# Patient Record
Sex: FEMALE | Race: WHITE | NOT HISPANIC OR LATINO
[De-identification: names, ages, dates, MRNs, and addresses within clinical notes are randomized per-mention and may not be internally consistent; named-entity substitution may affect disease eponyms.]

---
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
## 2021-09-20 ENCOUNTER — FORM ENCOUNTER (OUTPATIENT)
Age: 21
End: 2021-09-20

## 2021-09-21 ENCOUNTER — FORM ENCOUNTER (OUTPATIENT)
Age: 21
End: 2021-09-21

## 2021-09-21 ENCOUNTER — APPOINTMENT (OUTPATIENT)
Dept: OBGYN | Facility: CLINIC | Age: 21
End: 2021-09-21
Payer: COMMERCIAL

## 2021-09-21 PROCEDURE — 99395 PREV VISIT EST AGE 18-39: CPT

## 2021-09-21 PROCEDURE — 36415 COLL VENOUS BLD VENIPUNCTURE: CPT

## 2021-09-22 ENCOUNTER — FORM ENCOUNTER (OUTPATIENT)
Age: 21
End: 2021-09-22

## 2022-03-07 DIAGNOSIS — Z30.9 ENCOUNTER FOR CONTRACEPTIVE MANAGEMENT, UNSPECIFIED: ICD-10-CM

## 2022-06-06 ENCOUNTER — RX RENEWAL (OUTPATIENT)
Age: 22
End: 2022-06-06

## 2022-09-30 ENCOUNTER — APPOINTMENT (OUTPATIENT)
Dept: OBGYN | Facility: CLINIC | Age: 22
End: 2022-09-30

## 2022-09-30 VITALS
HEIGHT: 63 IN | DIASTOLIC BLOOD PRESSURE: 72 MMHG | WEIGHT: 145 LBS | BODY MASS INDEX: 25.69 KG/M2 | SYSTOLIC BLOOD PRESSURE: 108 MMHG

## 2022-09-30 PROCEDURE — 99395 PREV VISIT EST AGE 18-39: CPT

## 2022-10-03 RX ORDER — ALPRAZOLAM 0.25 MG/1
0.25 TABLET ORAL
Qty: 3 | Refills: 0 | Status: ACTIVE | COMMUNITY
Start: 2022-10-03 | End: 1900-01-01

## 2022-10-03 NOTE — END OF VISIT
[FreeTextEntry3] : I, Adriana Booker, acted solely as a scribe for Dr. Sudha Ocasio, on 09/30/2022. \par \par All medical record entries made by the scribe were at my, Dr. Sudha Ocasio., direction and personally dictated by me on 09/30/2022. I have personally reviewed the chart and agree that the record accurately reflects my personal performance of the history, physical exam, assessment and plan.\par

## 2022-10-03 NOTE — PLAN
[FreeTextEntry1] : PIYUSH WEI is a 22 year old presenting for annual GYN exam\par -exam deferred due to menses \par -right breast ultrasound \par -BSE \par -to schedule pap and Mirena IUD insertion; considering kyleena,  take ibuprofen 600mg and alprazolam 0.25mg 30 minutes prior \par -RTO for IUD insertion
No
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
## 2022-10-03 NOTE — PHYSICAL EXAM
[Appropriately responsive] : appropriately responsive [Alert] : alert [No Acute Distress] : no acute distress [No Lymphadenopathy] : no lymphadenopathy [Soft] : soft [Non-tender] : non-tender [Non-distended] : non-distended [No HSM] : No HSM [No Lesions] : no lesions [No Mass] : no mass [Oriented x3] : oriented x3 [Examination Of The Breasts] : a normal appearance [Normal] : normal [___cm] : a ~M [unfilled] ~Ucm superior lateral quadrant mass was palpated

## 2022-11-07 ENCOUNTER — APPOINTMENT (OUTPATIENT)
Dept: OBGYN | Facility: CLINIC | Age: 22
End: 2022-11-07

## 2022-11-07 VITALS — DIASTOLIC BLOOD PRESSURE: 73 MMHG | WEIGHT: 132 LBS | SYSTOLIC BLOOD PRESSURE: 112 MMHG

## 2022-11-07 DIAGNOSIS — Z30.430 ENCOUNTER FOR INSERTION OF INTRAUTERINE CONTRACEPTIVE DEVICE: ICD-10-CM

## 2022-11-07 PROCEDURE — 58300 INSERT INTRAUTERINE DEVICE: CPT

## 2022-11-07 PROCEDURE — 81025 URINE PREGNANCY TEST: CPT

## 2022-11-07 NOTE — PROCEDURE
[LMP ___] : LMP was [unfilled] [Neg Pregnancy Test] : a pregnancy test was negative [Lot Number: ___] : IUD lot number: [unfilled] [IUD Placement] : intrauterine device (IUD) placement [Prevention of Pregnancy] : prevention of pregnancy [Risks] : risks [Benefits] : benefits [Alternatives] : alternatives [Patient] : patient [Infection] : infection [Bleeding] : bleeding [Pain] : pain [Expulsion] : expulsion [Failure] : failure [Uterine Perforation] : uterine perforation [None] : none [Easy Passage] : allowed easy passage of a uterine sound without dilation [Tolerated Well] : the patient tolerated the procedure well [No Complications] : there were no complications [Uterus Sounded to ___cm] : sounded to [unfilled]Ucm [US Guidance] : Ultrasound guidance was used to place the IUD [Motrin/Ibuprofen] : Motrin/Ibuprofen [Post Placement Transvag. US] : post placement transvaginal ultrasound completed [Pelvic Pain] : the patient complained of pelvic pain [de-identified] : urine pregnancy test negative at time of visit today [de-identified] : alprazolam 0.25mg  [de-identified] : Sonja clamp [de-identified] : giselle EXP 11/2023

## 2022-11-07 NOTE — END OF VISIT
[FreeTextEntry3] : I, Gigi Junior, acted as a scribe on behalf of Dr. Sudha Ocasio on 11/07/2022 .\par \par All medical entries made by the scribe were at my, Dr. Sudha Ocasio's, direction and personally dictated by me on 11/07/2022. I have reviewed the chart and agree that the record accurately reflects my personal performance of the history, physical exam, assessment and plan. I have also personally directed, reviewed, and agreed with the chart.

## 2022-11-08 LAB
C TRACH RRNA SPEC QL NAA+PROBE: NOT DETECTED
N GONORRHOEA RRNA SPEC QL NAA+PROBE: NOT DETECTED
SOURCE TP AMPLIFICATION: NORMAL

## 2022-11-13 LAB — CYTOLOGY CVX/VAG DOC THIN PREP: NORMAL

## 2022-12-01 ENCOUNTER — APPOINTMENT (OUTPATIENT)
Dept: OBGYN | Facility: CLINIC | Age: 22
End: 2022-12-01

## 2022-12-20 ENCOUNTER — APPOINTMENT (OUTPATIENT)
Dept: OBGYN | Facility: CLINIC | Age: 22
End: 2022-12-20

## 2022-12-20 VITALS
BODY MASS INDEX: 23.57 KG/M2 | HEIGHT: 63 IN | HEART RATE: 65 BPM | OXYGEN SATURATION: 99 % | WEIGHT: 133 LBS | DIASTOLIC BLOOD PRESSURE: 72 MMHG | SYSTOLIC BLOOD PRESSURE: 112 MMHG

## 2022-12-20 DIAGNOSIS — Z30.431 ENCOUNTER FOR ROUTINE CHECKING OF INTRAUTERINE CONTRACEPTIVE DEVICE: ICD-10-CM

## 2022-12-20 PROCEDURE — 99213 OFFICE O/P EST LOW 20 MIN: CPT

## 2022-12-20 NOTE — HISTORY OF PRESENT ILLNESS
[FreeTextEntry1] : PIYUSH WEI is a 22 year old presenting for IUD check. Pt had Kyleena IUD placed 11/7/22. Pt reports still bleeding, varies in heaviness. \par Neg pap 9/30/22.  [TextBox_4] : pt here today for IUD check.

## 2022-12-20 NOTE — END OF VISIT
[FreeTextEntry3] : I, Adriana Booker, acted solely as a scribe for Dr. Sudha Ocasio, on 12/20/2022. \par \par All medical record entries made by the scribe were at my, Dr. Sudha Ocasio., direction and personally dictated by me on 12/20/2022. I have personally reviewed the chart and agree that the record accurately reflects my personal performance of the history, physical exam, assessment and plan.\par

## 2022-12-20 NOTE — PLAN
[FreeTextEntry1] : PIYUSH WEI is a 22 year old presenting for IUD check\par -TVUS showed Kyleena IUD in appropriate position\par -advised starting OCP for a month to stabilize bleeding\par \par RTO for annual 11/2023

## 2023-03-28 ENCOUNTER — APPOINTMENT (OUTPATIENT)
Dept: OBGYN | Facility: CLINIC | Age: 23
End: 2023-03-28
Payer: COMMERCIAL

## 2023-03-28 VITALS
SYSTOLIC BLOOD PRESSURE: 110 MMHG | HEIGHT: 63 IN | DIASTOLIC BLOOD PRESSURE: 69 MMHG | BODY MASS INDEX: 25.34 KG/M2 | WEIGHT: 143 LBS | OXYGEN SATURATION: 97 % | HEART RATE: 86 BPM

## 2023-03-28 DIAGNOSIS — N76.0 ACUTE VAGINITIS: ICD-10-CM

## 2023-03-28 DIAGNOSIS — Z11.3 ENCOUNTER FOR SCREENING FOR INFECTIONS WITH A PREDOMINANTLY SEXUAL MODE OF TRANSMISSION: ICD-10-CM

## 2023-03-28 DIAGNOSIS — B96.89 ACUTE VAGINITIS: ICD-10-CM

## 2023-03-28 DIAGNOSIS — R10.2 PELVIC AND PERINEAL PAIN: ICD-10-CM

## 2023-03-28 DIAGNOSIS — Z11.8 ENCOUNTER FOR SCREENING FOR OTHER INFECTIOUS AND PARASITIC DISEASES: ICD-10-CM

## 2023-03-28 DIAGNOSIS — N63.10 UNSPECIFIED LUMP IN THE RIGHT BREAST, UNSPECIFIED QUADRANT: ICD-10-CM

## 2023-03-28 DIAGNOSIS — Z11.59 ENCOUNTER FOR SCREENING FOR OTHER VIRAL DISEASES: ICD-10-CM

## 2023-03-28 PROCEDURE — 99213 OFFICE O/P EST LOW 20 MIN: CPT

## 2023-03-28 NOTE — PLAN
[FreeTextEntry1] : 21 yo female pt complaining of pelvic pain\par \par -BD affirm, GC/CT\par -sono showed IUD in place, normal ovaries, small free fluid in cul-de-sac, possible ruptured ovarian cyst\par \par \par -rx for right breast ultrasound\par -rto September for annual exam \par

## 2023-03-28 NOTE — HISTORY OF PRESENT ILLNESS
[Patient reported PAP Smear was normal] : Patient reported PAP Smear was normal [FreeTextEntry1] : 23 yo female pt presents with pelvic pain. The pt had a Kyleena IUD inserted Nov. 2022. Had IUD check in December 2022. The pt currently c/o pelvic discomfort onset Saturday and worsened Sunday night, now improved. Patient denies vaginal discharge, odor or urinary symptoms. The pt also reports having a new right breast mass, was given an US in the past but did not go. \par \par The pt is with the same partner. Denies new sexual partner. [PapSmeardate] : 11/22

## 2023-03-28 NOTE — PROCEDURE
[Locate IUD] : locate IUD [FreeTextEntry3] : IUD was in place, ovaries were normal, free fluid in the cul de sac

## 2023-03-29 LAB
C TRACH RRNA SPEC QL NAA+PROBE: NOT DETECTED
N GONORRHOEA RRNA SPEC QL NAA+PROBE: NOT DETECTED
SOURCE AMPLIFICATION: NORMAL

## 2023-03-30 LAB
CANDIDA VAG CYTO: NOT DETECTED
G VAGINALIS+PREV SP MTYP VAG QL MICRO: DETECTED
T VAGINALIS VAG QL WET PREP: NOT DETECTED

## 2023-03-31 RX ORDER — METRONIDAZOLE 7.5 MG/G
0.75 GEL VAGINAL
Qty: 1 | Refills: 0 | Status: ACTIVE | COMMUNITY
Start: 2023-03-31 | End: 1900-01-01

## 2023-04-01 ENCOUNTER — OUTPATIENT (OUTPATIENT)
Dept: OUTPATIENT SERVICES | Facility: HOSPITAL | Age: 23
LOS: 1 days | End: 2023-04-01

## 2023-04-01 ENCOUNTER — RESULT REVIEW (OUTPATIENT)
Age: 23
End: 2023-04-01

## 2023-04-01 ENCOUNTER — APPOINTMENT (OUTPATIENT)
Dept: ULTRASOUND IMAGING | Facility: CLINIC | Age: 23
End: 2023-04-01
Payer: COMMERCIAL

## 2023-04-01 ENCOUNTER — TRANSCRIPTION ENCOUNTER (OUTPATIENT)
Age: 23
End: 2023-04-01

## 2023-04-01 PROCEDURE — 76641 ULTRASOUND BREAST COMPLETE: CPT | Mod: 26,RT

## 2023-04-05 ENCOUNTER — APPOINTMENT (OUTPATIENT)
Dept: ULTRASOUND IMAGING | Facility: IMAGING CENTER | Age: 23
End: 2023-04-05
Payer: COMMERCIAL

## 2023-04-05 ENCOUNTER — OUTPATIENT (OUTPATIENT)
Dept: OUTPATIENT SERVICES | Facility: HOSPITAL | Age: 23
LOS: 1 days | End: 2023-04-05
Payer: COMMERCIAL

## 2023-04-05 DIAGNOSIS — N63.10 UNSPECIFIED LUMP IN THE RIGHT BREAST, UNSPECIFIED QUADRANT: ICD-10-CM

## 2023-04-05 PROCEDURE — 88305 TISSUE EXAM BY PATHOLOGIST: CPT | Mod: 26

## 2023-04-05 PROCEDURE — A4648: CPT

## 2023-04-05 PROCEDURE — 88305 TISSUE EXAM BY PATHOLOGIST: CPT

## 2023-04-05 PROCEDURE — 19083 BX BREAST 1ST LESION US IMAG: CPT

## 2023-04-05 PROCEDURE — 19083 BX BREAST 1ST LESION US IMAG: CPT | Mod: RT

## 2023-04-07 LAB — SURGICAL PATHOLOGY STUDY: SIGNIFICANT CHANGE UP

## 2023-09-29 ENCOUNTER — APPOINTMENT (OUTPATIENT)
Dept: OBGYN | Facility: CLINIC | Age: 23
End: 2023-09-29
Payer: COMMERCIAL

## 2023-09-29 VITALS
DIASTOLIC BLOOD PRESSURE: 71 MMHG | HEIGHT: 63 IN | WEIGHT: 140 LBS | SYSTOLIC BLOOD PRESSURE: 108 MMHG | BODY MASS INDEX: 24.8 KG/M2

## 2023-09-29 PROCEDURE — 58301 REMOVE INTRAUTERINE DEVICE: CPT

## 2023-10-12 ENCOUNTER — APPOINTMENT (OUTPATIENT)
Dept: OBGYN | Facility: CLINIC | Age: 23
End: 2023-10-12
Payer: COMMERCIAL

## 2023-10-12 VITALS
HEIGHT: 63 IN | WEIGHT: 140 LBS | SYSTOLIC BLOOD PRESSURE: 119 MMHG | DIASTOLIC BLOOD PRESSURE: 85 MMHG | BODY MASS INDEX: 24.8 KG/M2

## 2023-10-12 DIAGNOSIS — Z01.419 ENCOUNTER FOR GYNECOLOGICAL EXAMINATION (GENERAL) (ROUTINE) W/OUT ABNORMAL FINDINGS: ICD-10-CM

## 2023-10-12 PROCEDURE — 99395 PREV VISIT EST AGE 18-39: CPT

## 2023-10-14 PROBLEM — Z01.419 WOMEN'S ANNUAL ROUTINE GYNECOLOGICAL EXAMINATION: Status: ACTIVE | Noted: 2022-10-03

## 2023-11-24 ENCOUNTER — APPOINTMENT (OUTPATIENT)
Dept: OBGYN | Facility: CLINIC | Age: 23
End: 2023-11-24
Payer: COMMERCIAL

## 2023-11-24 VITALS
DIASTOLIC BLOOD PRESSURE: 72 MMHG | WEIGHT: 138 LBS | BODY MASS INDEX: 24.45 KG/M2 | SYSTOLIC BLOOD PRESSURE: 113 MMHG | HEIGHT: 63 IN

## 2023-11-24 DIAGNOSIS — O03.4 INCOMPLETE SPONTANEOUS ABORTION W/OUT COMPLICATION: ICD-10-CM

## 2023-11-24 PROCEDURE — 76817 TRANSVAGINAL US OBSTETRIC: CPT

## 2023-11-24 PROCEDURE — 36415 COLL VENOUS BLD VENIPUNCTURE: CPT

## 2023-11-24 PROCEDURE — 99214 OFFICE O/P EST MOD 30 MIN: CPT | Mod: 25

## 2023-11-27 ENCOUNTER — TRANSCRIPTION ENCOUNTER (OUTPATIENT)
Age: 23
End: 2023-11-27

## 2023-11-27 LAB
ABO + RH PNL BLD: NORMAL
C TRACH RRNA SPEC QL NAA+PROBE: NOT DETECTED
HCG SERPL-MCNC: ABNORMAL MIU/ML
N GONORRHOEA RRNA SPEC QL NAA+PROBE: NOT DETECTED
SOURCE AMPLIFICATION: NORMAL

## 2023-11-30 ENCOUNTER — APPOINTMENT (OUTPATIENT)
Dept: OBGYN | Facility: CLINIC | Age: 23
End: 2023-11-30
Payer: COMMERCIAL

## 2023-11-30 VITALS
WEIGHT: 142 LBS | HEIGHT: 63 IN | BODY MASS INDEX: 25.16 KG/M2 | SYSTOLIC BLOOD PRESSURE: 115 MMHG | HEART RATE: 85 BPM | DIASTOLIC BLOOD PRESSURE: 75 MMHG

## 2023-11-30 DIAGNOSIS — O02.1 MISSED ABORTION: ICD-10-CM

## 2023-11-30 PROCEDURE — 99213 OFFICE O/P EST LOW 20 MIN: CPT

## 2023-12-04 ENCOUNTER — OUTPATIENT (OUTPATIENT)
Dept: OUTPATIENT SERVICES | Facility: HOSPITAL | Age: 23
LOS: 1 days | End: 2023-12-04
Payer: COMMERCIAL

## 2023-12-04 VITALS
WEIGHT: 138.89 LBS | SYSTOLIC BLOOD PRESSURE: 107 MMHG | OXYGEN SATURATION: 98 % | HEART RATE: 98 BPM | TEMPERATURE: 98 F | RESPIRATION RATE: 20 BRPM | DIASTOLIC BLOOD PRESSURE: 70 MMHG | HEIGHT: 63 IN

## 2023-12-04 DIAGNOSIS — O02.1 MISSED ABORTION: ICD-10-CM

## 2023-12-04 DIAGNOSIS — Z01.818 ENCOUNTER FOR OTHER PREPROCEDURAL EXAMINATION: ICD-10-CM

## 2023-12-04 LAB
BLD GP AB SCN SERPL QL: NEGATIVE — SIGNIFICANT CHANGE UP
BLD GP AB SCN SERPL QL: NEGATIVE — SIGNIFICANT CHANGE UP
HCT VFR BLD CALC: 34.1 % — LOW (ref 34.5–45)
HCT VFR BLD CALC: 34.1 % — LOW (ref 34.5–45)
HGB BLD-MCNC: 12.3 G/DL — SIGNIFICANT CHANGE UP (ref 11.5–15.5)
HGB BLD-MCNC: 12.3 G/DL — SIGNIFICANT CHANGE UP (ref 11.5–15.5)
MCHC RBC-ENTMCNC: 31.4 PG — SIGNIFICANT CHANGE UP (ref 27–34)
MCHC RBC-ENTMCNC: 31.4 PG — SIGNIFICANT CHANGE UP (ref 27–34)
MCHC RBC-ENTMCNC: 36.1 GM/DL — HIGH (ref 32–36)
MCHC RBC-ENTMCNC: 36.1 GM/DL — HIGH (ref 32–36)
MCV RBC AUTO: 87 FL — SIGNIFICANT CHANGE UP (ref 80–100)
MCV RBC AUTO: 87 FL — SIGNIFICANT CHANGE UP (ref 80–100)
NRBC # BLD: 0 /100 WBCS — SIGNIFICANT CHANGE UP (ref 0–0)
NRBC # BLD: 0 /100 WBCS — SIGNIFICANT CHANGE UP (ref 0–0)
PLATELET # BLD AUTO: 227 K/UL — SIGNIFICANT CHANGE UP (ref 150–400)
PLATELET # BLD AUTO: 227 K/UL — SIGNIFICANT CHANGE UP (ref 150–400)
RBC # BLD: 3.92 M/UL — SIGNIFICANT CHANGE UP (ref 3.8–5.2)
RBC # BLD: 3.92 M/UL — SIGNIFICANT CHANGE UP (ref 3.8–5.2)
RBC # FLD: 11.6 % — SIGNIFICANT CHANGE UP (ref 10.3–14.5)
RBC # FLD: 11.6 % — SIGNIFICANT CHANGE UP (ref 10.3–14.5)
RH IG SCN BLD-IMP: POSITIVE — SIGNIFICANT CHANGE UP
RH IG SCN BLD-IMP: POSITIVE — SIGNIFICANT CHANGE UP
WBC # BLD: 12.43 K/UL — HIGH (ref 3.8–10.5)
WBC # BLD: 12.43 K/UL — HIGH (ref 3.8–10.5)
WBC # FLD AUTO: 12.43 K/UL — HIGH (ref 3.8–10.5)
WBC # FLD AUTO: 12.43 K/UL — HIGH (ref 3.8–10.5)

## 2023-12-04 PROCEDURE — 86901 BLOOD TYPING SEROLOGIC RH(D): CPT

## 2023-12-04 PROCEDURE — 36415 COLL VENOUS BLD VENIPUNCTURE: CPT

## 2023-12-04 PROCEDURE — G0463: CPT

## 2023-12-04 PROCEDURE — 86900 BLOOD TYPING SEROLOGIC ABO: CPT

## 2023-12-04 PROCEDURE — 86850 RBC ANTIBODY SCREEN: CPT

## 2023-12-04 PROCEDURE — 85027 COMPLETE CBC AUTOMATED: CPT

## 2023-12-04 RX ORDER — LIDOCAINE HCL 20 MG/ML
0.2 VIAL (ML) INJECTION ONCE
Refills: 0 | Status: DISCONTINUED | OUTPATIENT
Start: 2023-12-08 | End: 2023-12-22

## 2023-12-04 RX ORDER — SODIUM CHLORIDE 9 MG/ML
1000 INJECTION, SOLUTION INTRAVENOUS
Refills: 0 | Status: DISCONTINUED | OUTPATIENT
Start: 2023-12-08 | End: 2023-12-22

## 2023-12-04 NOTE — H&P PST ADULT - ASSESSMENT
Airway:  normal    Mallampati-       Dental: Patient denies loose teeth    Activity :  dasi mets :     Airway:  normal    Mallampati-       Dental: Patient denies loose teeth    Activity : ACTIVE ALL DAY   dasi mets : 6 Dasi mets

## 2023-12-04 NOTE — H&P PST ADULT - HISTORY OF PRESENT ILLNESS
23 yr old female , G0 , with amenorrhea & positive pregnancy test - Had a sonogram that revealed 9 weeks with no FHR. Now coming in for  D & C for missed  on 2023.     Denies Recent travel, Exposure or Covid symptoms

## 2023-12-04 NOTE — H&P PST ADULT - ATTENDING COMMENTS
Attending Note  Pt with MAB 9 weeks here for d&c. discussed risks of procedure including but not limited to VTE, SSI, uterine perforation, need for transfusion. Reviewed risks of surgery all questions answered. Pt aware that learners are part of her case and performing EUA.  RH+  Anabell Solano MD

## 2023-12-04 NOTE — H&P PST ADULT - BP NONINVASIVE MEAN (MM HG)
3/13/2017       RE: Jannie Dexter  [address] [place] [place] [address]     Dear Colleague,    Thank you for referring your patient, Jannie Dexter, to the [facility] at [facility]. Please see a copy of my visit note below.    [facility] Dermatology Note      Dermatology Problem List:  1. NUB on the chest: SCC, BCC, BLK, AK, irritated cherry angioma.   -s/p shave biopsy 3/13/2017  2. Rosacea w/ Telangiectasia of face.  -s/p PDL 2/10/2017 (full face)  -Anticipated Next PDL settings --> decreasing purpura  Energy: 8 J/cm2 (Inc from 7)  Spot size:10mm  Pulse width: 10 mS (Inc from 6)  3. Frequent Tanning victoria use: will continue talking her out of it    Encounter Date: Mar 13, 2017    CC:  Chief Complaint   Patient presents with     RECHECK     post pdl 4 weeks redness is better but not where she wants it yet          History of Present Illness:  Ms. Jannie Kingsley is a 35 year old female who presents as a follow up post PDL treatment for rosacea and NUB on chest. She was last seen 2/10/2017 when pt underwent PDL for rosacea. Today, the pt reports the PDL improved the outside of her cheeks and parts of her nose but pt states the bruising was really intense. She is otherwise pleased with her results and is ready for another round of treatment. Her rosacea worsens right after a hot shower. She is interested in another session but will do it after her wedding and honeymoon in [place].  In addition, pt states she is ready to biopsy the spot on her chest. It has been growing and shrinking in size, as well as, spontaneously bleeding. No other skin concerns at this time.     Past Medical History:   Patient Active Problem List   Diagnosis     Nexplanon in place     CARDIOVASCULAR SCREENING; LDL GOAL LESS THAN 160     Exercise-induced asthma     Bunion     Intermittent asthma     Past Medical History   Diagnosis Date     Asthma      Exercised  induced      Nexplanon in place      placed 1/3/13     Seasonal allergies      Past Surgical History   Procedure Laterality Date     Breast surgery  11/2006     Implants, saline     Bunionectomy  11/1/13     left foot       Social History:  Wedding and [employer] in [place] in May 2017  The patient works as a . The patient admits to frequent use of tanning beds. She admits to consumption of 7-14 alcoholic beverages a week and is a nonsmoker.     Family History:  Reviewed and unchanged but kept in chart for clinician convenience  There is no family history of skin cancer or melanoma.    Medications:  Current Outpatient Prescriptions   Medication Sig Dispense Refill     spironolactone (ALDACTONE) 100 MG tablet TAKE ONE TABLET BY MOUTH ONE TIME DAILY 30 tablet 6     glycopyrrolate (ROBINUL) 1 MG tablet Take 1 tablet (1 mg) by mouth 3 times daily 90 tablet 6     triamcinolone (KENALOG) 0.1 % cream Apply to affected area in axilla bid for 7-10 consecutive days, not to be used on face or groin 15 g 1     cetirizine (ZYRTEC) 10 MG tablet Take 1 tablet (10 mg) by mouth every evening 60 tablet 11     albuterol (PROAIR HFA, PROVENTIL HFA, VENTOLIN HFA) 108 (90 BASE) MCG/ACT inhaler Inhale 2 puffs into the lungs every 6 hours as needed for shortness of breath / dyspnea 1 Inhaler 0       Allergies   Allergen Reactions     Penicillins Rash     Fever       Review of Systems:  -Constitutional: The patient is otherwise feeling well.       Physical exam:  Vitals: There were no vitals taken for this visit.  GEN: This is a well-nourished, well developed female in no acute distress  NEURO: Alert and oriented  PSYCH: in a pleasant mood, appropriate affect  SKIN: Focused examination of the Head, face, neck, upper chest was performed.  -There is mild erythema on the forehead and lateral face, improved since prior to PDL  -resolution of telangiectasias on the cheeks.  -There are minimal lacy erythema on the forehead and  nose.   -On the right medial chest there is a 8 mm sized pink papule with excoriation.  -No other lesions of concern on areas examined.       Impression/Plan:  1. Neoplasm of uncertain behavior on the right chest. DDX: BCC, SCC, BLK, AK, irritated cherry angioma . Given history of tanning victoria use.    Shave biopsy:  After discussion of benefits and risks including but not limited to bleeding/bruising, pain/swelling, infection, scar, incomplete removal, nerve damage/numbness, recurrence, and non-diagnostic biopsy, written consent, verbal consent and photographs were obtained. Time-out was performed. The area was cleaned with isopropyl alcohol. 0.5mL of 1% lidocaine with epinephrine was injected to obtain adequate anesthesia of the lesion on the right chest. A shave biopsy was performed. Hemostasis was achieved with aluminium chloride. Vaseline and a sterile dressing were applied. The patient tolerated the procedure and no complications were noted. The patient was provided with verbal and written post care instructions.   2. Rosacea w/ Telangiectasia: improved with 1 PDL. New photos taken. Pt will need another PDL but focused on the nose and cheek but better AFTER wedding in case things other.  Will only treat nose and cheeks most likely.     Pt is interested in another round of PDL treatment.  method of treatment and scheduled for cosmetic treatment. WArned that it may take 3 treatments to achieve adequate results. Side effect eye damage, bruising, redness, swelling, scarring among others.      Follow-up in prn pending biopsy results. Can schedule at her leisure for another PDL for rosacea/redness      Staff Involved:  Scribe/Staff      Scribe Disclosure:   I, Guerrero Walters, am serving as a scribe to document services personally performed by Dr. Pawel Obrien, based on data collection and the provider's statements to me.     Provider Disclosure:   I have reviewed the documentation recorded by the scribe and have edited it  as needed. I have personally performed the services documented here and the documentation accurately represents those services and the decisions made by me.     Pawel Obrien MD, MS    Department of Dermatology  [facility]: Phone: [phone], Fax:[phone]  [facility]: Phone: [phone], Fax: [phone]             1/05/2017                                                                                                   3/13/2017                                                Again, thank you for allowing me to participate in the care of your patient.      Sincerely,    Pawel Obrien MD       82

## 2023-12-05 ENCOUNTER — TRANSCRIPTION ENCOUNTER (OUTPATIENT)
Age: 23
End: 2023-12-05

## 2023-12-07 ENCOUNTER — TRANSCRIPTION ENCOUNTER (OUTPATIENT)
Age: 23
End: 2023-12-07

## 2023-12-07 RX ORDER — FENTANYL CITRATE 50 UG/ML
25 INJECTION INTRAVENOUS
Refills: 0 | Status: DISCONTINUED | OUTPATIENT
Start: 2023-12-08 | End: 2023-12-08

## 2023-12-07 RX ORDER — ONDANSETRON 8 MG/1
4 TABLET, FILM COATED ORAL ONCE
Refills: 0 | Status: DISCONTINUED | OUTPATIENT
Start: 2023-12-08 | End: 2023-12-22

## 2023-12-08 ENCOUNTER — RESULT REVIEW (OUTPATIENT)
Age: 23
End: 2023-12-08

## 2023-12-08 ENCOUNTER — TRANSCRIPTION ENCOUNTER (OUTPATIENT)
Age: 23
End: 2023-12-08

## 2023-12-08 ENCOUNTER — OUTPATIENT (OUTPATIENT)
Dept: OUTPATIENT SERVICES | Facility: HOSPITAL | Age: 23
LOS: 1 days | End: 2023-12-08
Payer: COMMERCIAL

## 2023-12-08 VITALS
RESPIRATION RATE: 16 BRPM | HEIGHT: 63 IN | WEIGHT: 138.89 LBS | HEART RATE: 111 BPM | DIASTOLIC BLOOD PRESSURE: 63 MMHG | OXYGEN SATURATION: 98 % | TEMPERATURE: 98 F | SYSTOLIC BLOOD PRESSURE: 114 MMHG

## 2023-12-08 VITALS
HEART RATE: 76 BPM | SYSTOLIC BLOOD PRESSURE: 108 MMHG | DIASTOLIC BLOOD PRESSURE: 56 MMHG | OXYGEN SATURATION: 100 % | RESPIRATION RATE: 16 BRPM

## 2023-12-08 DIAGNOSIS — O02.1 MISSED ABORTION: ICD-10-CM

## 2023-12-08 LAB
BLD GP AB SCN SERPL QL: NEGATIVE — SIGNIFICANT CHANGE UP
BLD GP AB SCN SERPL QL: NEGATIVE — SIGNIFICANT CHANGE UP
RH IG SCN BLD-IMP: POSITIVE — SIGNIFICANT CHANGE UP
RH IG SCN BLD-IMP: POSITIVE — SIGNIFICANT CHANGE UP

## 2023-12-08 PROCEDURE — 59820 CARE OF MISCARRIAGE: CPT

## 2023-12-08 PROCEDURE — 88305 TISSUE EXAM BY PATHOLOGIST: CPT | Mod: 26

## 2023-12-08 PROCEDURE — 76998 US GUIDE INTRAOP: CPT | Mod: 26

## 2023-12-08 PROCEDURE — 88291 CYTO/MOLECULAR REPORT: CPT

## 2023-12-08 PROCEDURE — 59840 INDUCED ABORTION D&C: CPT

## 2023-12-08 PROCEDURE — 86900 BLOOD TYPING SEROLOGIC ABO: CPT

## 2023-12-08 PROCEDURE — 88264 CHROMOSOME ANALYSIS 20-25: CPT

## 2023-12-08 PROCEDURE — 86901 BLOOD TYPING SEROLOGIC RH(D): CPT

## 2023-12-08 PROCEDURE — 88305 TISSUE EXAM BY PATHOLOGIST: CPT

## 2023-12-08 PROCEDURE — 88280 CHROMOSOME KARYOTYPE STUDY: CPT

## 2023-12-08 PROCEDURE — 86850 RBC ANTIBODY SCREEN: CPT

## 2023-12-08 PROCEDURE — 88233 TISSUE CULTURE SKIN/BIOPSY: CPT

## 2023-12-08 RX ORDER — ACETAMINOPHEN 500 MG
3 TABLET ORAL
Qty: 0 | Refills: 0 | DISCHARGE

## 2023-12-08 RX ORDER — IBUPROFEN 200 MG
1 TABLET ORAL
Qty: 0 | Refills: 0 | DISCHARGE

## 2023-12-08 RX ADMIN — SODIUM CHLORIDE 100 MILLILITER(S): 9 INJECTION, SOLUTION INTRAVENOUS at 09:09

## 2023-12-08 NOTE — ASU PATIENT PROFILE, ADULT - FALL HARM RISK - UNIVERSAL INTERVENTIONS
Bed in lowest position, wheels locked, appropriate side rails in place/Call bell, personal items and telephone in reach/Instruct patient to call for assistance before getting out of bed or chair/Non-slip footwear when patient is out of bed/[place] to call system/Physically safe environment - no spills, clutter or unnecessary equipment/Purposeful Proactive Rounding/Room/bathroom lighting operational, light cord in reach Bed in lowest position, wheels locked, appropriate side rails in place/Call bell, personal items and telephone in reach/Instruct patient to call for assistance before getting out of bed or chair/Non-slip footwear when patient is out of bed/[place] to call system/Physically safe environment - no spills, clutter or unnecessary equipment/Purposeful Proactive Rounding/Room/bathroom lighting operational, light cord in reach

## 2023-12-08 NOTE — ASU DISCHARGE PLAN (ADULT/PEDIATRIC) - NURSING INSTRUCTIONS
OK to take Tylenol/Acetaminophen at _/ after 5:55PM_____ for pain and every 6 hours after as needed. OK to take Motrin/Ibuprofen at _? after 6:05PM____ for pain and every 6 hours after as needed. Take pain medicines alternate.

## 2023-12-08 NOTE — ASU DISCHARGE PLAN (ADULT/PEDIATRIC) - ASU DC SPECIAL INSTRUCTIONSFT
Regular diet as tolerated, regular activity as tolerated, no heavy lifting for first two weeks.  Nothing per vagina: no intercourse, tampons or douching.  Call your provider if you experience fevers, chills, worsening abdominal pain, inability to urinate or worsening vaginal bleeding.  Follow up with Dr. Solano in 2 weeks.

## 2023-12-08 NOTE — ASU DISCHARGE PLAN (ADULT/PEDIATRIC) - PROVIDER TOKENS
PROVIDER:[TOKEN:[[id number]],FOLLOWUP:[2 weeks]] PROVIDER:[TOKEN:[[id number]],FOLLOWUP:[2 weeks]]

## 2023-12-08 NOTE — ASU DISCHARGE PLAN (ADULT/PEDIATRIC) - CARE PROVIDER_API CALL
Anabell Solano  Obstetrics and Gynecology  [address] [place] [address], [facility] [address]  [place] [address]  Phone: [phone]  Fax: [phone]  Follow Up Time: 2 weeks   Anabell Solano  Obstetrics and Gynecology  [address] [place] [address], [facility] [address]  [place] [address]  Phone: [phone]  Fax: [phone]  Follow Up Time: 2 weeks

## 2023-12-08 NOTE — ASU DISCHARGE PLAN (ADULT/PEDIATRIC) - NS MD DC FALL RISK RISK
For information on Fall & Injury Prevention, visit: [email or web address] OR  [email or web address] OR  https://www.cdc.gov/steadi/patient.html For information on Fall & Injury Prevention, visit: [email or web address]-[email or web address] OR  [email or web address]/[email or web address]-prevention-tips-to-avoid-injury OR  https://www.cdc.gov/steadi/patient.html

## 2023-12-08 NOTE — BRIEF OPERATIVE NOTE - OPERATION/FINDINGS
INCOMPLETE Ultrasound revealed 9 wk size anteverted uterus with findings consistent with missed . DVC performed under ultrasound guidance. Ultrasound after the procedure showing a thin endometrial stripe. Excellent hemostasis.

## 2023-12-08 NOTE — BRIEF OPERATIVE NOTE - NSICDXBRIEFPROCEDURE_GEN_ALL_CORE_FT
PROCEDURES:  Dilation and curettage, uterus, using suction, with US guidance 08-Dec-2023 12:22:09  Reva Maldonado

## 2023-12-08 NOTE — ASU PATIENT PROFILE, ADULT - FALL HARM RISK - PATIENT NEEDS ASSISTANCE
Date of Service: 11/01/2022    HISTORY OF PRESENT ILLNESS:   Vicente Dickson is a pleasant and conversant 57-year-old gentleman referred by Dr. Luis Enrique Zendejas whom the patient had worked with for a symptomatic right inguinal hernia.    Vicente has had a fairly eventful past year with a diagnosis of significant chronic liver disease with ascites related to previous alcohol use, I believe.  He has worked with Dr. Gaffney and the hepatology team over at [facility] and has had a remarkable turnaround with improvement in his liver function tests as well as his exam with ascites, etc.  He has had a significant amount of weight loss saying that he has lost over 100 pounds \"due to the illness\" since this all started, which I am sure includes significant loss of ascitic fluid weight. During that time, he has had a symptomatic right inguinal hernia, which was quite bulging and uncomfortable, but was told \"in no uncertain terms\" that he could not have surgical intervention during his illness.  It was mentioned that it could be repaired during his liver transplant should that occur.  With his improvement in health,  he recently visited back to the hepatology service on 10/30/2022 and per review of that note from Melida Bruce PA-C., there is mention of \"okay to proceed with hernia repair\" and the note having been reviewed by Dr. Gaffney.      The patient is very open to discussion and wants to know our thoughts regarding what he should do, timing and even the location of doing so.  He states that he had had this appointment after that visit with the transplant service over at [facility] just a couple days ago and wanted to keep it based on what he had heard. He does note he has had a history of an umbilical hernia repair \"a long time ago.\"    He also notes that he was recently traveling with his family and had so much discomfort in his right groin that he was not really able to do things he needed to do and it popped out multiple  times.  He has not had obstructive symptoms.    ALLERGIES:     He has seasonal allergies only.      MEDICATIONS:    1.  Campral EC.    2.  Baclofen.    3.  Prozac.    4.  Folate.    5.  Lasix.    6.  Nicoderm patch.    7.  Lyrica.    8.  Xifaxan.    9.  Thiamine.    10.  Desyrel.     PAST MEDICAL HISTORY:   His medical history was reviewed, includes his end-stage liver disease, history of tobacco use, history of morbid obesity, now with a BMI of 26 kilograms per meter squared.      PAST SURGICAL HISTORY:   Surgically, he has had a previous umbilical hernia repair in 2009  at [facility].  He is not sure if mesh was used.  He has had a previous TURP in 10/2020 by Dr. Gill that went very well with an improvement in his quality of life.    FAMILY HISTORY:   Negative for any definite hernia disease, but does note significant history of alcohol use in the family.      SOCIAL HISTORY:   Tobacco use:  He has \"1-2 per day\" at times, but he knows he needs to abstain before an intervention.  He does not drink or use drugs at this time.    REVIEW OF SYSTEMS:   Negative for any significant exertional chest pain or shortness of breath and not surprisingly,  he feels that his health is generally significantly improved.  He is both appreciative of that and wants to keep it that way.  He denies left groin pain.  Urinary symptoms are remarkably better.  He has not noted a recurrence at his umbilicus.    PHYSICAL EXAMINATION:   GENERAL:  He is a pleasant, talkative gentleman who does not appear to be in distress.  VITAL SIGNS:  Stable.  He is afebrile.  He is 6 feet 3, 95 kg, BMI is now 26 kg/m2.  HEENT:  Sclerae appear to be  anicteric.  He did not have any facial rash or hypervascularity that was significant. Mask was in place.   NECK:  Neck was thin.  No adenopathy appreciated.  He has excess skin hanging around his neck.   BREASTS:  He has gynecomastia visible.   CARDIOVASCULAR:  Heart by palpation has a regular rate and  rhythm.    LUNGS:  Have normal respiratory effort.  No cough, wheezing or accessory breathing muscles or auto CPAPing.  ABDOMEN:  Has a fair to moderate amount of excess adipose tissue related to his weight loss.  Infraumbilical incision is well healed.  I do not feel or see a definite recurrent abdominal wall hernia.   GENITOURINARY:  Groin examination is positive for right inguinal hernia that was uncomfortable to palpate.  Exam today felt that there was fatty tissue within the hernia.  I was not able to appreciate significant ascites filled hernia sac, both with him recumbent and upright, hard to definitely tell if there is a hernia present on the left, but it is not of the size of the right.    EXTREMITIES:  Have present femoral pulses without significant cyanosis or edema.  There was some clubbing of the fingernails noted.    DIAGNOSTIC DATA:   CT scan of the abdomen and pelvis from 08/26/2021 was reviewed with the patient.  At that time, he had a significant ascites burden as well as an ascites filled right inguinal hernia, possible small fatty filled left inguinal hernia.  No convincing abdominal hernia was  present.      ASSESSMENT AND PLAN:   A 57-year-old gentleman who presents with a symptomatic right inguinal hernia.  He has been previously asked to delay his desire to have this fixed due to his significant end-stage liver disease and the acuity of that illness, which seems to have improved dramatically.  The patient saw a hepatology service, notes that he is \"okay\" to proceed with the hernia surgery, but no other details were noted.    I spent a fair amount of time with Vicente talking about not only the hernia issue, but its relationship to his end-stage liver disease history and ascites.  We also covered the topics of a pre-liver transplant status and the risks of anesthesia as the major risks that are quoted in this scenario, which can include decompensation of his liver disease,  even in his current  improved state.    In my hands, I would typically approach a unilateral inguinal hernia in a patient with significant weight loss and his history using an open approach with the use of mesh under MAC IV.  I stressed to the patient that it is very important for him to discuss this with Dr. Gaffney and her service regarding where this hernia should be repaired, whether it be at his potential transplant site or at another tertiary [facility] care center like [facility].  I told him that I would appreciate getting that direct confirmation that it is okay for us to proceed with this here and if not, I would ask him to see Dr. Viveros et al, over at [facility] to discuss options, which he was actually remarkably quite open and agreeable to should it be necessary.    Again, we left it today at a potential for moving forward with an open right inguinal herniorrhaphy with the use of mesh.  We discussed the risks, benefits, alternatives, and possible outcomes of procedure including but not limited to the risk of hernia recurrence, chronic groin pain scenario as well as the small, but present risk of mesh-related complications.  I told him I would like to hear either way whether it is going to be here or there and he was agreeable to that.  He did not really talk about timing as much, but again indicated that he is hopefully going to be able to move forward with this one way or another.  We will wait to hear back from him regarding his thoughts as well as confirmation of his eligibility for surgery at this institution from his transplant service.      Copy to:   Melida Bruce PA-C.  [address] [place] [address].  [id number]        Dictated By: Ayad Salas MD  Signing Provider: MD KEE Flores/ernie ([id number])   DD: 11/01/2022 2:48:55 PM TD: 11/01/2022 3:17:56 PM      Copy Sent To:  MD Samy Mckeon MD Kia Saeian, MD  Nonsystem Provider     No assistance needed

## 2023-12-11 ENCOUNTER — EMERGENCY (EMERGENCY)
Facility: HOSPITAL | Age: 23
LOS: 1 days | Discharge: HOME | End: 2023-12-11
Attending: EMERGENCY MEDICINE
Payer: COMMERCIAL

## 2023-12-11 ENCOUNTER — NON-APPOINTMENT (OUTPATIENT)
Age: 23
End: 2023-12-11

## 2023-12-11 VITALS
HEART RATE: 79 BPM | OXYGEN SATURATION: 100 % | SYSTOLIC BLOOD PRESSURE: 112 MMHG | DIASTOLIC BLOOD PRESSURE: 56 MMHG | TEMPERATURE: 99 F | RESPIRATION RATE: 19 BRPM

## 2023-12-11 VITALS
HEART RATE: 94 BPM | WEIGHT: 138.01 LBS | RESPIRATION RATE: 18 BRPM | DIASTOLIC BLOOD PRESSURE: 72 MMHG | SYSTOLIC BLOOD PRESSURE: 128 MMHG | OXYGEN SATURATION: 98 % | TEMPERATURE: 100 F | HEIGHT: 63 IN

## 2023-12-11 PROBLEM — O02.1 MISSED ABORTION: Chronic | Status: ACTIVE | Noted: 2023-12-04

## 2023-12-11 PROBLEM — D24.1 BENIGN NEOPLASM OF RIGHT BREAST: Chronic | Status: ACTIVE | Noted: 2023-12-04

## 2023-12-11 LAB
ALBUMIN SERPL ELPH-MCNC: 4.4 G/DL — SIGNIFICANT CHANGE UP (ref 3.3–5)
ALBUMIN SERPL ELPH-MCNC: 4.4 G/DL — SIGNIFICANT CHANGE UP (ref 3.3–5)
ALP SERPL-CCNC: 40 U/L — SIGNIFICANT CHANGE UP (ref 40–120)
ALP SERPL-CCNC: 40 U/L — SIGNIFICANT CHANGE UP (ref 40–120)
ALT FLD-CCNC: 11 U/L — SIGNIFICANT CHANGE UP (ref 10–45)
ALT FLD-CCNC: 11 U/L — SIGNIFICANT CHANGE UP (ref 10–45)
ANION GAP SERPL CALC-SCNC: 12 MMOL/L — SIGNIFICANT CHANGE UP (ref 5–17)
ANION GAP SERPL CALC-SCNC: 12 MMOL/L — SIGNIFICANT CHANGE UP (ref 5–17)
APPEARANCE UR: CLEAR — SIGNIFICANT CHANGE UP
APPEARANCE UR: CLEAR — SIGNIFICANT CHANGE UP
AST SERPL-CCNC: 12 U/L — SIGNIFICANT CHANGE UP (ref 10–40)
AST SERPL-CCNC: 12 U/L — SIGNIFICANT CHANGE UP (ref 10–40)
BACTERIA # UR AUTO: NEGATIVE /HPF — SIGNIFICANT CHANGE UP
BACTERIA # UR AUTO: NEGATIVE /HPF — SIGNIFICANT CHANGE UP
BASE EXCESS BLDV CALC-SCNC: 0.4 MMOL/L — SIGNIFICANT CHANGE UP (ref -2–3)
BASE EXCESS BLDV CALC-SCNC: 0.4 MMOL/L — SIGNIFICANT CHANGE UP (ref -2–3)
BILIRUB SERPL-MCNC: 0.7 MG/DL — SIGNIFICANT CHANGE UP (ref 0.2–1.2)
BILIRUB SERPL-MCNC: 0.7 MG/DL — SIGNIFICANT CHANGE UP (ref 0.2–1.2)
BILIRUB UR-MCNC: NEGATIVE — SIGNIFICANT CHANGE UP
BILIRUB UR-MCNC: NEGATIVE — SIGNIFICANT CHANGE UP
BUN SERPL-MCNC: 12 MG/DL — SIGNIFICANT CHANGE UP (ref 7–23)
BUN SERPL-MCNC: 12 MG/DL — SIGNIFICANT CHANGE UP (ref 7–23)
CA-I SERPL-SCNC: 1.23 MMOL/L — SIGNIFICANT CHANGE UP (ref 1.15–1.33)
CA-I SERPL-SCNC: 1.23 MMOL/L — SIGNIFICANT CHANGE UP (ref 1.15–1.33)
CALCIUM SERPL-MCNC: 9.4 MG/DL — SIGNIFICANT CHANGE UP (ref 8.4–10.5)
CALCIUM SERPL-MCNC: 9.4 MG/DL — SIGNIFICANT CHANGE UP (ref 8.4–10.5)
CAST: 0 /LPF — SIGNIFICANT CHANGE UP (ref 0–4)
CAST: 0 /LPF — SIGNIFICANT CHANGE UP (ref 0–4)
CHLORIDE BLDV-SCNC: 107 MMOL/L — SIGNIFICANT CHANGE UP (ref 96–108)
CHLORIDE BLDV-SCNC: 107 MMOL/L — SIGNIFICANT CHANGE UP (ref 96–108)
CHLORIDE SERPL-SCNC: 105 MMOL/L — SIGNIFICANT CHANGE UP (ref 96–108)
CHLORIDE SERPL-SCNC: 105 MMOL/L — SIGNIFICANT CHANGE UP (ref 96–108)
CO2 BLDV-SCNC: 28 MMOL/L — HIGH (ref 22–26)
CO2 BLDV-SCNC: 28 MMOL/L — HIGH (ref 22–26)
CO2 SERPL-SCNC: 22 MMOL/L — SIGNIFICANT CHANGE UP (ref 22–31)
CO2 SERPL-SCNC: 22 MMOL/L — SIGNIFICANT CHANGE UP (ref 22–31)
COLOR SPEC: YELLOW — SIGNIFICANT CHANGE UP
COLOR SPEC: YELLOW — SIGNIFICANT CHANGE UP
CREAT SERPL-MCNC: 0.82 MG/DL — SIGNIFICANT CHANGE UP (ref 0.5–1.3)
CREAT SERPL-MCNC: 0.82 MG/DL — SIGNIFICANT CHANGE UP (ref 0.5–1.3)
DIFF PNL FLD: NEGATIVE — SIGNIFICANT CHANGE UP
DIFF PNL FLD: NEGATIVE — SIGNIFICANT CHANGE UP
EGFR: 104 ML/MIN/1.73M2 — SIGNIFICANT CHANGE UP
EGFR: 104 ML/MIN/1.73M2 — SIGNIFICANT CHANGE UP
FLUAV AG NPH QL: SIGNIFICANT CHANGE UP
FLUAV AG NPH QL: SIGNIFICANT CHANGE UP
FLUBV AG NPH QL: SIGNIFICANT CHANGE UP
FLUBV AG NPH QL: SIGNIFICANT CHANGE UP
GAS PNL BLDV: 136 MMOL/L — SIGNIFICANT CHANGE UP (ref 136–145)
GAS PNL BLDV: 136 MMOL/L — SIGNIFICANT CHANGE UP (ref 136–145)
GAS PNL BLDV: SIGNIFICANT CHANGE UP
GLUCOSE BLDV-MCNC: 89 MG/DL — SIGNIFICANT CHANGE UP (ref 70–99)
GLUCOSE BLDV-MCNC: 89 MG/DL — SIGNIFICANT CHANGE UP (ref 70–99)
GLUCOSE SERPL-MCNC: 105 MG/DL — HIGH (ref 70–99)
GLUCOSE SERPL-MCNC: 105 MG/DL — HIGH (ref 70–99)
GLUCOSE UR QL: NEGATIVE MG/DL — SIGNIFICANT CHANGE UP
GLUCOSE UR QL: NEGATIVE MG/DL — SIGNIFICANT CHANGE UP
HCG SERPL-ACNC: 1366 MIU/ML — HIGH
HCG SERPL-ACNC: 1366 MIU/ML — HIGH
HCO3 BLDV-SCNC: 26 MMOL/L — SIGNIFICANT CHANGE UP (ref 22–29)
HCO3 BLDV-SCNC: 26 MMOL/L — SIGNIFICANT CHANGE UP (ref 22–29)
HCT VFR BLD CALC: 32.5 % — LOW (ref 34.5–45)
HCT VFR BLD CALC: 32.5 % — LOW (ref 34.5–45)
HCT VFR BLDA CALC: 34 % — LOW (ref 34.5–46.5)
HCT VFR BLDA CALC: 34 % — LOW (ref 34.5–46.5)
HGB BLD CALC-MCNC: 11.2 G/DL — LOW (ref 11.7–16.1)
HGB BLD CALC-MCNC: 11.2 G/DL — LOW (ref 11.7–16.1)
HGB BLD-MCNC: 11.2 G/DL — LOW (ref 11.5–15.5)
HGB BLD-MCNC: 11.2 G/DL — LOW (ref 11.5–15.5)
KETONES UR-MCNC: NEGATIVE MG/DL — SIGNIFICANT CHANGE UP
KETONES UR-MCNC: NEGATIVE MG/DL — SIGNIFICANT CHANGE UP
LACTATE BLDV-MCNC: 0.9 MMOL/L — SIGNIFICANT CHANGE UP (ref 0.5–2)
LACTATE BLDV-MCNC: 0.9 MMOL/L — SIGNIFICANT CHANGE UP (ref 0.5–2)
LEUKOCYTE ESTERASE UR-ACNC: NEGATIVE — SIGNIFICANT CHANGE UP
LEUKOCYTE ESTERASE UR-ACNC: NEGATIVE — SIGNIFICANT CHANGE UP
MCHC RBC-ENTMCNC: 30.9 PG — SIGNIFICANT CHANGE UP (ref 27–34)
MCHC RBC-ENTMCNC: 30.9 PG — SIGNIFICANT CHANGE UP (ref 27–34)
MCHC RBC-ENTMCNC: 34.5 GM/DL — SIGNIFICANT CHANGE UP (ref 32–36)
MCHC RBC-ENTMCNC: 34.5 GM/DL — SIGNIFICANT CHANGE UP (ref 32–36)
MCV RBC AUTO: 89.5 FL — SIGNIFICANT CHANGE UP (ref 80–100)
MCV RBC AUTO: 89.5 FL — SIGNIFICANT CHANGE UP (ref 80–100)
NITRITE UR-MCNC: NEGATIVE — SIGNIFICANT CHANGE UP
NITRITE UR-MCNC: NEGATIVE — SIGNIFICANT CHANGE UP
NRBC # BLD: 0 /100 WBCS — SIGNIFICANT CHANGE UP (ref 0–0)
NRBC # BLD: 0 /100 WBCS — SIGNIFICANT CHANGE UP (ref 0–0)
PCO2 BLDV: 48 MMHG — HIGH (ref 39–42)
PCO2 BLDV: 48 MMHG — HIGH (ref 39–42)
PH BLDV: 7.35 — SIGNIFICANT CHANGE UP (ref 7.32–7.43)
PH BLDV: 7.35 — SIGNIFICANT CHANGE UP (ref 7.32–7.43)
PH UR: 6.5 — SIGNIFICANT CHANGE UP (ref 5–8)
PH UR: 6.5 — SIGNIFICANT CHANGE UP (ref 5–8)
PLATELET # BLD AUTO: 187 K/UL — SIGNIFICANT CHANGE UP (ref 150–400)
PLATELET # BLD AUTO: 187 K/UL — SIGNIFICANT CHANGE UP (ref 150–400)
PO2 BLDV: 33 MMHG — SIGNIFICANT CHANGE UP (ref 25–45)
PO2 BLDV: 33 MMHG — SIGNIFICANT CHANGE UP (ref 25–45)
POTASSIUM BLDV-SCNC: 3.7 MMOL/L — SIGNIFICANT CHANGE UP (ref 3.5–5.1)
POTASSIUM BLDV-SCNC: 3.7 MMOL/L — SIGNIFICANT CHANGE UP (ref 3.5–5.1)
POTASSIUM SERPL-MCNC: 4.5 MMOL/L — SIGNIFICANT CHANGE UP (ref 3.5–5.3)
POTASSIUM SERPL-MCNC: 4.5 MMOL/L — SIGNIFICANT CHANGE UP (ref 3.5–5.3)
POTASSIUM SERPL-SCNC: 4.5 MMOL/L — SIGNIFICANT CHANGE UP (ref 3.5–5.3)
POTASSIUM SERPL-SCNC: 4.5 MMOL/L — SIGNIFICANT CHANGE UP (ref 3.5–5.3)
PROT SERPL-MCNC: 6.7 G/DL — SIGNIFICANT CHANGE UP (ref 6–8.3)
PROT SERPL-MCNC: 6.7 G/DL — SIGNIFICANT CHANGE UP (ref 6–8.3)
PROT UR-MCNC: 30 MG/DL
PROT UR-MCNC: 30 MG/DL
RBC # BLD: 3.63 M/UL — LOW (ref 3.8–5.2)
RBC # BLD: 3.63 M/UL — LOW (ref 3.8–5.2)
RBC # FLD: 11.8 % — SIGNIFICANT CHANGE UP (ref 10.3–14.5)
RBC # FLD: 11.8 % — SIGNIFICANT CHANGE UP (ref 10.3–14.5)
RBC CASTS # UR COMP ASSIST: 2 /HPF — SIGNIFICANT CHANGE UP (ref 0–4)
RBC CASTS # UR COMP ASSIST: 2 /HPF — SIGNIFICANT CHANGE UP (ref 0–4)
RSV RNA NPH QL NAA+NON-PROBE: SIGNIFICANT CHANGE UP
RSV RNA NPH QL NAA+NON-PROBE: SIGNIFICANT CHANGE UP
SAO2 % BLDV: 43.2 % — LOW (ref 67–88)
SAO2 % BLDV: 43.2 % — LOW (ref 67–88)
SARS-COV-2 RNA SPEC QL NAA+PROBE: SIGNIFICANT CHANGE UP
SARS-COV-2 RNA SPEC QL NAA+PROBE: SIGNIFICANT CHANGE UP
SODIUM SERPL-SCNC: 139 MMOL/L — SIGNIFICANT CHANGE UP (ref 135–145)
SODIUM SERPL-SCNC: 139 MMOL/L — SIGNIFICANT CHANGE UP (ref 135–145)
SP GR SPEC: 1.01 — SIGNIFICANT CHANGE UP (ref 1–1.03)
SP GR SPEC: 1.01 — SIGNIFICANT CHANGE UP (ref 1–1.03)
SQUAMOUS # UR AUTO: 3 /HPF — SIGNIFICANT CHANGE UP (ref 0–5)
SQUAMOUS # UR AUTO: 3 /HPF — SIGNIFICANT CHANGE UP (ref 0–5)
UROBILINOGEN FLD QL: 0.2 MG/DL — SIGNIFICANT CHANGE UP (ref 0.2–1)
UROBILINOGEN FLD QL: 0.2 MG/DL — SIGNIFICANT CHANGE UP (ref 0.2–1)
WBC # BLD: 10.57 K/UL — HIGH (ref 3.8–10.5)
WBC # BLD: 10.57 K/UL — HIGH (ref 3.8–10.5)
WBC # FLD AUTO: 10.57 K/UL — HIGH (ref 3.8–10.5)
WBC # FLD AUTO: 10.57 K/UL — HIGH (ref 3.8–10.5)
WBC UR QL: 2 /HPF — SIGNIFICANT CHANGE UP (ref 0–5)
WBC UR QL: 2 /HPF — SIGNIFICANT CHANGE UP (ref 0–5)

## 2023-12-11 PROCEDURE — 84132 ASSAY OF SERUM POTASSIUM: CPT

## 2023-12-11 PROCEDURE — 85014 HEMATOCRIT: CPT

## 2023-12-11 PROCEDURE — 72193 CT PELVIS W/DYE: CPT | Mod: MA

## 2023-12-11 PROCEDURE — 85027 COMPLETE CBC AUTOMATED: CPT

## 2023-12-11 PROCEDURE — 84295 ASSAY OF SERUM SODIUM: CPT

## 2023-12-11 PROCEDURE — 76830 TRANSVAGINAL US NON-OB: CPT | Mod: 26

## 2023-12-11 PROCEDURE — 99283 EMERGENCY DEPT VISIT LOW MDM: CPT

## 2023-12-11 PROCEDURE — 87040 BLOOD CULTURE FOR BACTERIA: CPT

## 2023-12-11 PROCEDURE — 99285 EMERGENCY DEPT VISIT HI MDM: CPT

## 2023-12-11 PROCEDURE — 72193 CT PELVIS W/DYE: CPT | Mod: 26,MA

## 2023-12-11 PROCEDURE — 93976 VASCULAR STUDY: CPT | Mod: 26

## 2023-12-11 PROCEDURE — 81001 URINALYSIS AUTO W/SCOPE: CPT

## 2023-12-11 PROCEDURE — 84702 CHORIONIC GONADOTROPIN TEST: CPT

## 2023-12-11 PROCEDURE — 82947 ASSAY GLUCOSE BLOOD QUANT: CPT

## 2023-12-11 PROCEDURE — 76830 TRANSVAGINAL US NON-OB: CPT

## 2023-12-11 PROCEDURE — 83605 ASSAY OF LACTIC ACID: CPT

## 2023-12-11 PROCEDURE — 99285 EMERGENCY DEPT VISIT HI MDM: CPT | Mod: 25

## 2023-12-11 PROCEDURE — 80053 COMPREHEN METABOLIC PANEL: CPT

## 2023-12-11 PROCEDURE — 82803 BLOOD GASES ANY COMBINATION: CPT

## 2023-12-11 PROCEDURE — 93976 VASCULAR STUDY: CPT

## 2023-12-11 PROCEDURE — 82435 ASSAY OF BLOOD CHLORIDE: CPT

## 2023-12-11 PROCEDURE — 82330 ASSAY OF CALCIUM: CPT

## 2023-12-11 PROCEDURE — 87637 SARSCOV2&INF A&B&RSV AMP PRB: CPT

## 2023-12-11 PROCEDURE — 85018 HEMOGLOBIN: CPT

## 2023-12-11 RX ORDER — KETOROLAC TROMETHAMINE 30 MG/ML
15 SYRINGE (ML) INJECTION ONCE
Refills: 0 | Status: DISCONTINUED | OUTPATIENT
Start: 2023-12-11 | End: 2023-12-11

## 2023-12-11 RX ORDER — SODIUM CHLORIDE 9 MG/ML
1000 INJECTION INTRAMUSCULAR; INTRAVENOUS; SUBCUTANEOUS ONCE
Refills: 0 | Status: COMPLETED | OUTPATIENT
Start: 2023-12-11 | End: 2023-12-11

## 2023-12-11 RX ORDER — ACETAMINOPHEN 500 MG
1000 TABLET ORAL ONCE
Refills: 0 | Status: COMPLETED | OUTPATIENT
Start: 2023-12-11 | End: 2023-12-11

## 2023-12-11 RX ADMIN — SODIUM CHLORIDE 1000 MILLILITER(S): 9 INJECTION INTRAMUSCULAR; INTRAVENOUS; SUBCUTANEOUS at 08:36

## 2023-12-11 RX ADMIN — Medication 15 MILLIGRAM(S): at 02:14

## 2023-12-11 RX ADMIN — Medication 400 MILLIGRAM(S): at 02:16

## 2023-12-11 NOTE — ED ADULT NURSE REASSESSMENT NOTE - NS ED NURSE REASSESS COMMENT FT1
Report taken from Jorge HUSSEIN. Patient found in stretcher breathing spontaneously and unlabored on Room Air. No signs of respiratory distress @ this time. The patient is alert and orientated x4 and responds appropriately. Pt denies chest pain, palpitations, shortness of breath, headache, visual disturbances, numbness/tingling, fever, chills, diaphoresis,  nausea, vomiting, constipation, diarrhea, or urinary symptoms. Patient denies pain @ this time. Safety and comfort measures provided, bed locked and in lowest position, side rails up for safety. Awaiting transport to Ultrasound.

## 2023-12-11 NOTE — ED PROVIDER NOTE - NSFOLLOWUPINSTRUCTIONS_ED_ALL_ED_FT
Today you were seen in [facility] for fever chills and bodyaches.  While you are here you were evaluated for your symptoms.  You were both COVID and flu negative.  The CAT scan did not show any signs of intra-abdominal or intrapelvic infection.  Transvaginal ultrasound showed that you may have retained blood clots versus some retained products of conception, however OB/GYN evaluated and indicated that the fever is unlikely due to this.  We believe that you likely have a viral infection.    Please follow-up for your repeat beta-hCG test in 3 days.    Please follow-up with Dr. Solano on 12/20.    Please return to the emergency department if you have worsening vaginal bleeding, vaginal discharge, abdominal pain.    For your symptoms you can take Tylenol or Advil every 6-8 hours as needed.  Please stay hydrated. Today you were seen in [facility] for fever chills and bodyaches.  While you are here you were evaluated for your symptoms.  You were both COVID and flu negative.  The CAT scan did not show any signs of intra-abdominal or intrapelvic infection.  Transvaginal ultrasound showed that you may have retained blood clots versus some retained products of conception, however OB/GYN evaluated and indicated that the fever is unlikely due to this.  We believe that you likely have a viral infection.    Please follow-up for your repeat beta-hCG test in 3 days.    Please follow-up with Dr. Solano on 12/20.    Please return to the emergency department if you have worsening vaginal bleeding, vaginal discharge, abdominal pain.    For your symptoms you can take Tylenol or Advil every 6-8 hours as needed.  Please stay hydrated. Today you were seen in [facility] for fever chills and bodyaches.  While you are here you were evaluated for your symptoms.  You were both COVID and flu negative.  The CAT scan did not show any signs of intra-abdominal or intrapelvic infection.  Transvaginal ultrasound showed that you may have retained blood clots versus some retained products of conception, however OB/GYN evaluated and indicated that the fever is unlikely due to this.  We believe that you likely have a viral infection.    Please follow-up for your repeat beta-hCG test in 3 days (12/14)    Please follow-up with Dr. Solano on 12/20.    Please return to the emergency department if you have worsening vaginal bleeding, vaginal discharge, abdominal pain.    For your symptoms you can take Tylenol or Advil every 6-8 hours as needed.  Please stay hydrated. Today you were seen in [facility] for fever chills and bodyaches.  While you are here you were evaluated for your symptoms.  You were both COVID and flu negative.  The CAT scan did not show any signs of intra-abdominal or intrapelvic infection.  Transvaginal ultrasound showed that you may have retained blood clots versus some retained products of conception, however OB/GYN evaluated and indicated that the fever is unlikely due to this.  We believe that you likely have a viral infection.    Please follow-up for your repeat beta-hCG test in 3 days (12/14)    Please follow-up with Dr. Solano on 12/20.    Please return to the emergency department if you have worsening vaginal bleeding, vaginal discharge, abdominal pain.    For your symptoms you can take Tylenol or Advil every 6-8 hours as needed.  Please stay hydrated.

## 2023-12-11 NOTE — CONSULT NOTE ADULT - SUBJECTIVE AND OBJECTIVE BOX
PIYUSH WEI  23y  Female [id number]    HPI: Patient is a 22yo  now POD#3 s/p scheduled suction D&C for missed  with Dr. Solano, presenting with fever, chills, body aches. Reports a Tmax at home to 102 at 5pm last night which was sudden in onset with associated myalgias. The body aches initially began in the lower back and are now diffuse. Denies respiratory congestion, CP, SOB, abdominal pain, heavy VB, abnormal vaginal discharge.    Name of GYN Physician: Dr. Ocasio  OBHx: MAB with D&C (2023)  GynHx: AUB, denies known fibroids, cysts, endometriosis, STI's, Abnormal pap smears   PMH: breat fibroadenoma  PSH: D&C as above  Meds: none  Allx: NKDA  Social History: Denies smoking use, drug use, alcohol use    Vital Signs Last 24 Hrs  T(C): 36.8 (11 Dec 2023 06:41), Max: 37.5 (11 Dec 2023 00:09)  T(F): 98.2 (11 Dec 2023 06:41), Max: 99.5 (11 Dec 2023 00:09)  HR: 77 (11 Dec 2023 06:41) (77 - 94)  BP: 129/61 (11 Dec 2023 06:41) (127/79 - 129/61)  RR: 18 (11 Dec 2023 06:41) (18 - 18)  SpO2: 100% (11 Dec 2023 06:41) (98% - 100%)    Parameters below as of 11 Dec 2023 06:41  Patient On (Oxygen Delivery Method): room air    Physical Exam:   General: sitting comfortably in bed, NAD   HEENT: neck supple, full ROM  CV: extremities well perfused  Lungs: normal respiratory effort on room air  Back: No CVA tenderness  Abd: Soft, non-tender, non-distended  :  No bleeding on pad.  External labia wnl. Bimanual exam with no cervical motion tenderness, uterus wnl, adnexa non palpable b/l.  Cervix closed vs. Cervix dilated  Speculum Exam: No active bleeding from os.  Ext: non-tender b/l, no edema     LABS:                        11.2   10.57 )-----------( 187      ( 11 Dec 2023 02:25 )             32.5     12-11    139  |  105  |  12  ----------------------------<  105<H>  4.5   |  22  |  0.82    Ca    9.4      11 Dec 2023 02:25    TPro  6.7  /  Alb  4.4  /  TBili  0.7  /  DBili  x   /  AST  12  /  ALT  11  /  AlkPhos  40  12-11    I&O's Detail  Urinalysis Basic - ( 11 Dec 2023 02:25 )    Color: Yellow / Appearance: Clear / S.010 / pH: x  Gluc: 105 mg/dL / Ketone: Negative mg/dL  / Bili: Negative / Urobili: 0.2 mg/dL   Blood: x / Protein: 30 mg/dL / Nitrite: Negative   Leuk Esterase: Negative / RBC: 2 /HPF / WBC 2 /HPF   Sq Epi: x / Non Sq Epi: 3 /HPF / Bacteria: Negative /HPF    RADIOLOGY & ADDITIONAL STUDIES:  < from: CT Pelvis w/ IV Cont (23 @ 03:39) >  ACC: [id number] EXAM:  CT PELVIS ONLY IC   ORDERED BY:  MARY STAPLES   PROCEDURE DATE:  2023        INTERPRETATION:  CLINICAL INFORMATION: Miscarriage status post dilatation   and curettage presenting with fever and lower back pain onpostop day 3    COMPARISON: None.    CONTRAST/COMPLICATIONS:  IV Contrast: Omnipaque 300  90 cc administered   10 cc discarded  Oral Contrast: NONE  Complications: None reported at time of study completion    PROCEDURE:  CT of the Pelvis was performed.  Sagittal and coronal reformats were performed.    FINDINGS:  BLADDER: Within normal limits.  REPRODUCTIVE ORGANS: Distended endometrial canal with heterogeneous   debris, 2.9 cm AP. Scattered foci of air within the vaginal canal.  LYMPH NODES: No pelvic lymphadenopathy.    VISUALIZED PORTIONS:  ABDOMINAL ORGANS: Within normal limits.  BOWEL: Within normal limits.  PERITONEUM: No ascites or pneumoperitoneum.  VESSELS: Within normal limits.  ABDOMINAL WALL: Within normal limits.  BONES: Within normal limits.    IMPRESSION:  Distended endometrial canal with heterogeneous debris, retained products   of conception cannot be excluded. Recommend transvaginal ultrasound for   further evaluation.    --- End of Report ---     DEYVI QUINONES DO; Resident Radiologist  This document has been electronically signed.   PABLO TURK MD; Attending Radiologist  This document has been electronically signed. Dec 11 2023  4:58AM    < end of copied text >       PIYUSH WEI  23y  Female [id number]    HPI: Patient is a 22yo  now POD#3 s/p scheduled suction D&C for missed  with Dr. Solano, presenting with fever, chills, body aches. Reports a Tmax at home to 102 at 5pm last night which was sudden in onset with associated myalgias. The body aches initially began in the lower back and are now diffuse. Denies respiratory congestion, CP, SOB, abdominal pain, heavy VB, abnormal vaginal discharge.    Name of GYN Physician: Dr. Ocasio  OBHx: MAB with D&C (2023)  GynHx: AUB, denies known fibroids, cysts, endometriosis, STI's, Abnormal pap smears   PMH: breat fibroadenoma  PSH: D&C as above  Meds: none  Allx: NKDA  Social History: Denies smoking use, drug use, alcohol use    Vital Signs Last 24 Hrs  T(C): 36.8 (11 Dec 2023 06:41), Max: 37.5 (11 Dec 2023 00:09)  T(F): 98.2 (11 Dec 2023 06:41), Max: 99.5 (11 Dec 2023 00:09)  HR: 77 (11 Dec 2023 06:41) (77 - 94)  BP: 129/61 (11 Dec 2023 06:41) (127/79 - 129/61)  RR: 18 (11 Dec 2023 06:41) (18 - 18)  SpO2: 100% (11 Dec 2023 06:41) (98% - 100%)    Parameters below as of 11 Dec 2023 06:41  Patient On (Oxygen Delivery Method): room air    Physical Exam:   General: sitting comfortably in bed, NAD   HEENT: neck supple, full ROM  CV: extremities well perfused  Lungs: normal respiratory effort on room air  Back: No CVA tenderness  Abd: Soft, non-tender, non-distended  :  No bleeding on pad.  External labia wnl. Bimanual exam with no cervical motion tenderness, uterus wnl, adnexa non palpable b/l.  Cervix closed vs. Cervix dilated  Speculum Exam: No active bleeding from os.  Ext: non-tender b/l, no edema     LABS:                        11.2   10.57 )-----------( 187      ( 11 Dec 2023 02:25 )             32.5     12-11    139  |  105  |  12  ----------------------------<  105<H>  4.5   |  22  |  0.82    Ca    9.4      11 Dec 2023 02:25    TPro  6.7  /  Alb  4.4  /  TBili  0.7  /  DBili  x   /  AST  12  /  ALT  11  /  AlkPhos  40  12-11    I&O's Detail  Urinalysis Basic - ( 11 Dec 2023 02:25 )    Color: Yellow / Appearance: Clear / S.010 / pH: x  Gluc: 105 mg/dL / Ketone: Negative mg/dL  / Bili: Negative / Urobili: 0.2 mg/dL   Blood: x / Protein: 30 mg/dL / Nitrite: Negative   Leuk Esterase: Negative / RBC: 2 /HPF / WBC 2 /HPF   Sq Epi: x / Non Sq Epi: 3 /HPF / Bacteria: Negative /HPF    RADIOLOGY & ADDITIONAL STUDIES:  < from: CT Pelvis w/ IV Cont (23 @ 03:39) >  ACC: [id number] EXAM:  CT PELVIS ONLY IC   ORDERED BY:  MARY STAPLES   PROCEDURE DATE:  2023        INTERPRETATION:  CLINICAL INFORMATION: Miscarriage status post dilatation   and curettage presenting with fever and lower back pain onpostop day 3    COMPARISON: None.    CONTRAST/COMPLICATIONS:  IV Contrast: Omnipaque 300  90 cc administered   10 cc discarded  Oral Contrast: NONE  Complications: None reported at time of study completion    PROCEDURE:  CT of the Pelvis was performed.  Sagittal and coronal reformats were performed.    FINDINGS:  BLADDER: Within normal limits.  REPRODUCTIVE ORGANS: Distended endometrial canal with heterogeneous   debris, 2.9 cm AP. Scattered foci of air within the vaginal canal.  LYMPH NODES: No pelvic lymphadenopathy.    VISUALIZED PORTIONS:  ABDOMINAL ORGANS: Within normal limits.  BOWEL: Within normal limits.  PERITONEUM: No ascites or pneumoperitoneum.  VESSELS: Within normal limits.  ABDOMINAL WALL: Within normal limits.  BONES: Within normal limits.    IMPRESSION:  Distended endometrial canal with heterogeneous debris, retained products   of conception cannot be excluded. Recommend transvaginal ultrasound for   further evaluation.    --- End of Report ---     DEYVI QUINONES DO; Resident Radiologist  This document has been electronically signed.   PABLO TURK MD; Attending Radiologist  This document has been electronically signed. Dec 11 2023  4:58AM    < end of copied text >       PIYUSH WEI  23y  Female [id number]    HPI: Patient is a 24yo  now POD#3 s/p scheduled suction D&C for missed  with Dr. Solano, presenting with fever, chills, body aches. Reports a Tmax at home to 102 at 10pm last night after feeling sudden onset of fevers/chills and myalgias at 5pm. The body aches initially began in the lower back and are now diffuse. Did not take any OTC medications prior to presenting to ED. She states her vaginal bleeding and cramping after the D&C subsided yesterday, she is no longer bleeding. Denies abnormal or foul smelling vaginal discharge, abdominal pain, pelvic pain. Denies respiratory congestion, CP, SOB, known sick contacts.     Name of GYN Physician: Dr. Ocasio  OBHx: MAB with D&C (2023)  GynHx: AUB, denies known fibroids, cysts, endometriosis, STI's, Abnormal pap smears   PMH: breat fibroadenoma  PSH: D&C as above  Meds: none  Allx: NKDA  Social History: Denies smoking use, drug use, alcohol use    Vital Signs Last 24 Hrs  T(C): 36.8 (11 Dec 2023 06:41), Max: 37.5 (11 Dec 2023 00:09)  T(F): 98.2 (11 Dec 2023 06:41), Max: 99.5 (11 Dec 2023 00:09)  HR: 77 (11 Dec 2023 06:41) (77 - 94)  BP: 129/61 (11 Dec 2023 06:41) (127/79 - 129/61)  RR: 18 (11 Dec 2023 06:41) (18 - 18)  SpO2: 100% (11 Dec 2023 06:41) (98% - 100%)    Parameters below as of 11 Dec 2023 06:41  Patient On (Oxygen Delivery Method): room air    Physical Exam: pt declined chaperone  General: sitting comfortably in bed, NAD   HEENT: neck supple, full ROM  CV: extremities well perfused  Lungs: normal respiratory effort on room air  Back: No CVA tenderness  Abd: Soft, non-tender, non-distended  : No bleeding on pad. External labia wnl. Bimanual exam with no cervical motion tenderness, uterus wnl, adnexa non palpable b/l. Cervix closed.  Speculum Exam: Physiologic, non foul smelling discharge noted. No bleeding.  Ext: non-tender b/l, no edema     LABS:                        11.2   10.57 )-----------( 187      ( 11 Dec 2023 02:25 )             32.5     12-    139  |  105  |  12  ----------------------------<  105<H>  4.5   |  22  |  0.82    Ca    9.4      11 Dec 2023 02:25    TPro  6.7  /  Alb  4.4  /  TBili  0.7  /  DBili  x   /  AST  12  /  ALT  11  /  AlkPhos  40      I&O's Detail  Urinalysis Basic - ( 11 Dec 2023 02:25 )    Color: Yellow / Appearance: Clear / S.010 / pH: x  Gluc: 105 mg/dL / Ketone: Negative mg/dL  / Bili: Negative / Urobili: 0.2 mg/dL   Blood: x / Protein: 30 mg/dL / Nitrite: Negative   Leuk Esterase: Negative / RBC: 2 /HPF / WBC 2 /HPF   Sq Epi: x / Non Sq Epi: 3 /HPF / Bacteria: Negative /HPF    RADIOLOGY & ADDITIONAL STUDIES:  < from: CT Pelvis w/ IV Cont (23 @ 03:39) >  ACC: [id number] EXAM:  CT PELVIS ONLY IC   ORDERED BY:  MARY STAPLES   PROCEDURE DATE:  2023        INTERPRETATION:  CLINICAL INFORMATION: Miscarriage status post dilatation   and curettage presenting with fever and lower back pain onpostop day 3    COMPARISON: None.    CONTRAST/COMPLICATIONS:  IV Contrast: Omnipaque 300  90 cc administered   10 cc discarded  Oral Contrast: NONE  Complications: None reported at time of study completion    PROCEDURE:  CT of the Pelvis was performed.  Sagittal and coronal reformats were performed.    FINDINGS:  BLADDER: Within normal limits.  REPRODUCTIVE ORGANS: Distended endometrial canal with heterogeneous   debris, 2.9 cm AP. Scattered foci of air within the vaginal canal.  LYMPH NODES: No pelvic lymphadenopathy.    VISUALIZED PORTIONS:  ABDOMINAL ORGANS: Within normal limits.  BOWEL: Within normal limits.  PERITONEUM: No ascites or pneumoperitoneum.  VESSELS: Within normal limits.  ABDOMINAL WALL: Within normal limits.  BONES: Within normal limits.    IMPRESSION:  Distended endometrial canal with heterogeneous debris, retained products   of conception cannot be excluded. Recommend transvaginal ultrasound for   further evaluation.    --- End of Report ---     DEYVI QUINONES DO; Resident Radiologist  This document has been electronically signed.   PABLO TURK MD; Attending Radiologist  This document has been electronically signed. Dec 11 2023  4:58AM    < end of copied text >  --------------------------------------------------------------------------------------------------------  < from: US Duplex Abdomen/Pelvis Limited (23 @ 08:02) >  ACC: [id number] EXAM:  US DPLX ABD PELV LTD   ORDERED BY:  MARY STAPLES   ACC: [id number] EXAM:  US TRANSVAGINAL   ORDERED BY:  MARY STAPLES   PROCEDURE DATE:  2023      INTERPRETATION:  CLINICAL INFORMATION: Dilation and curettage on Friday,   2023. Fever. Evaluate for retained products.    LMP: 2023    COMPARISON: None available.    TECHNIQUE:  Endovaginal pelvic sonogram only. Color and Spectral Doppler was   performed.    FINDINGS:  Uterus: 7.6 cm x 5.1 cm x 6.1 cm. Within normal limits.  Endometrium: 10 mm. Heterogeneous and thickened. No internal vascularity.    Right ovary: 1.4 cm x 2.9 cm x 1.3 cm. Within normal limits. Normal   arterial and venous waveforms.  Left ovary: 2.7 cm x 1.3 cm x 2.0 cm.Within normal limits. Normal   arterial and venous waveforms.    Fluid: Small free fluid in right and left adnexa.    IMPRESSION:  Heterogeneous and thickened endometrium measuring 10 mm. No internal   vascularity. Findings are suggestive of possibleretained blood clot   rather than retained products of conception, though retained products   cannot be excluded. Follow-up can be performed    --- End of Report ---    MAURA HAILE MD; Resident Radiologist  This document has been electronically signed.  MAXI A KAREN MD; Attending Radiologist  This document has been electronically signed. Dec 11 2023  8:45AM    < end of copied text >         PIYUSH WEI  23y  Female [id number]    HPI: Patient is a 22yo  now POD#3 s/p scheduled suction D&C for missed  with Dr. Solano, presenting with fever, chills, body aches. Reports a Tmax at home to 102 at 10pm last night after feeling sudden onset of fevers/chills and myalgias at 5pm. The body aches initially began in the lower back and are now diffuse. Did not take any OTC medications prior to presenting to ED. She states her vaginal bleeding and cramping after the D&C subsided yesterday, she is no longer bleeding. Denies abnormal or foul smelling vaginal discharge, abdominal pain, pelvic pain. Denies respiratory congestion, CP, SOB, known sick contacts.     Name of GYN Physician: Dr. Ocasio  OBHx: MAB with D&C (2023)  GynHx: AUB, denies known fibroids, cysts, endometriosis, STI's, Abnormal pap smears   PMH: breat fibroadenoma  PSH: D&C as above  Meds: none  Allx: NKDA  Social History: Denies smoking use, drug use, alcohol use    Vital Signs Last 24 Hrs  T(C): 36.8 (11 Dec 2023 06:41), Max: 37.5 (11 Dec 2023 00:09)  T(F): 98.2 (11 Dec 2023 06:41), Max: 99.5 (11 Dec 2023 00:09)  HR: 77 (11 Dec 2023 06:41) (77 - 94)  BP: 129/61 (11 Dec 2023 06:41) (127/79 - 129/61)  RR: 18 (11 Dec 2023 06:41) (18 - 18)  SpO2: 100% (11 Dec 2023 06:41) (98% - 100%)    Parameters below as of 11 Dec 2023 06:41  Patient On (Oxygen Delivery Method): room air    Physical Exam: pt declined chaperone  General: sitting comfortably in bed, NAD   HEENT: neck supple, full ROM  CV: extremities well perfused  Lungs: normal respiratory effort on room air  Back: No CVA tenderness  Abd: Soft, non-tender, non-distended  : No bleeding on pad. External labia wnl. Bimanual exam with no cervical motion tenderness, uterus wnl, adnexa non palpable b/l. Cervix closed.  Speculum Exam: Physiologic, non foul smelling discharge noted. No bleeding.  Ext: non-tender b/l, no edema     LABS:                        11.2   10.57 )-----------( 187      ( 11 Dec 2023 02:25 )             32.5     12-    139  |  105  |  12  ----------------------------<  105<H>  4.5   |  22  |  0.82    Ca    9.4      11 Dec 2023 02:25    TPro  6.7  /  Alb  4.4  /  TBili  0.7  /  DBili  x   /  AST  12  /  ALT  11  /  AlkPhos  40      I&O's Detail  Urinalysis Basic - ( 11 Dec 2023 02:25 )    Color: Yellow / Appearance: Clear / S.010 / pH: x  Gluc: 105 mg/dL / Ketone: Negative mg/dL  / Bili: Negative / Urobili: 0.2 mg/dL   Blood: x / Protein: 30 mg/dL / Nitrite: Negative   Leuk Esterase: Negative / RBC: 2 /HPF / WBC 2 /HPF   Sq Epi: x / Non Sq Epi: 3 /HPF / Bacteria: Negative /HPF    RADIOLOGY & ADDITIONAL STUDIES:  < from: CT Pelvis w/ IV Cont (23 @ 03:39) >  ACC: [id number] EXAM:  CT PELVIS ONLY IC   ORDERED BY:  MARY STAPLES   PROCEDURE DATE:  2023        INTERPRETATION:  CLINICAL INFORMATION: Miscarriage status post dilatation   and curettage presenting with fever and lower back pain onpostop day 3    COMPARISON: None.    CONTRAST/COMPLICATIONS:  IV Contrast: Omnipaque 300  90 cc administered   10 cc discarded  Oral Contrast: NONE  Complications: None reported at time of study completion    PROCEDURE:  CT of the Pelvis was performed.  Sagittal and coronal reformats were performed.    FINDINGS:  BLADDER: Within normal limits.  REPRODUCTIVE ORGANS: Distended endometrial canal with heterogeneous   debris, 2.9 cm AP. Scattered foci of air within the vaginal canal.  LYMPH NODES: No pelvic lymphadenopathy.    VISUALIZED PORTIONS:  ABDOMINAL ORGANS: Within normal limits.  BOWEL: Within normal limits.  PERITONEUM: No ascites or pneumoperitoneum.  VESSELS: Within normal limits.  ABDOMINAL WALL: Within normal limits.  BONES: Within normal limits.    IMPRESSION:  Distended endometrial canal with heterogeneous debris, retained products   of conception cannot be excluded. Recommend transvaginal ultrasound for   further evaluation.    --- End of Report ---     DEYVI QUINONES DO; Resident Radiologist  This document has been electronically signed.   PABLO TURK MD; Attending Radiologist  This document has been electronically signed. Dec 11 2023  4:58AM    < end of copied text >  --------------------------------------------------------------------------------------------------------  < from: US Duplex Abdomen/Pelvis Limited (23 @ 08:02) >  ACC: [id number] EXAM:  US DPLX ABD PELV LTD   ORDERED BY:  MARY STAPLES   ACC: [id number] EXAM:  US TRANSVAGINAL   ORDERED BY:  MARY STAPLES   PROCEDURE DATE:  2023      INTERPRETATION:  CLINICAL INFORMATION: Dilation and curettage on Friday,   2023. Fever. Evaluate for retained products.    LMP: 2023    COMPARISON: None available.    TECHNIQUE:  Endovaginal pelvic sonogram only. Color and Spectral Doppler was   performed.    FINDINGS:  Uterus: 7.6 cm x 5.1 cm x 6.1 cm. Within normal limits.  Endometrium: 10 mm. Heterogeneous and thickened. No internal vascularity.    Right ovary: 1.4 cm x 2.9 cm x 1.3 cm. Within normal limits. Normal   arterial and venous waveforms.  Left ovary: 2.7 cm x 1.3 cm x 2.0 cm.Within normal limits. Normal   arterial and venous waveforms.    Fluid: Small free fluid in right and left adnexa.    IMPRESSION:  Heterogeneous and thickened endometrium measuring 10 mm. No internal   vascularity. Findings are suggestive of possibleretained blood clot   rather than retained products of conception, though retained products   cannot be excluded. Follow-up can be performed    --- End of Report ---    MAURA HAILE MD; Resident Radiologist  This document has been electronically signed.  MAXI A KAREN MD; Attending Radiologist  This document has been electronically signed. Dec 11 2023  8:45AM    < end of copied text >         **DRAFT UNTIL DISCUSSION WITH ATTENDING**    PIYUSH WEI  23y  Female [id number]    HPI: Patient is a 24yo  now POD#3 s/p scheduled suction D&C for missed  with Dr. Solano, presenting with fever, chills, body aches. Reports a Tmax at home to 102 at 10pm last night after feeling sudden onset of fevers/chills and myalgias at 5pm. The body aches initially began in the lower back and are now diffuse. Did not take any OTC medications prior to presenting to ED. She states her vaginal bleeding and cramping after the D&C subsided yesterday, she is no longer bleeding. Denies abnormal or foul smelling vaginal discharge, abdominal pain, pelvic pain. Denies respiratory congestion, CP, SOB, known sick contacts.     Name of GYN Physician: Dr. Ocasio  OBHx: MAB with D&C (2023)  GynHx: AUB, denies known fibroids, cysts, endometriosis, STI's, Abnormal pap smears   PMH: breat fibroadenoma  PSH: D&C as above  Meds: none  Allx: NKDA  Social History: Denies smoking use, drug use, alcohol use    Vital Signs Last 24 Hrs  T(C): 36.8 (11 Dec 2023 06:41), Max: 37.5 (11 Dec 2023 00:09)  T(F): 98.2 (11 Dec 2023 06:41), Max: 99.5 (11 Dec 2023 00:09)  HR: 77 (11 Dec 2023 06:41) (77 - 94)  BP: 129/61 (11 Dec 2023 06:41) (127/79 - 129/61)  RR: 18 (11 Dec 2023 06:41) (18 - 18)  SpO2: 100% (11 Dec 2023 06:41) (98% - 100%)    Parameters below as of 11 Dec 2023 06:41  Patient On (Oxygen Delivery Method): room air    Physical Exam: pt declined chaperone  General: sitting comfortably in bed, NAD   HEENT: neck supple, full ROM  CV: extremities well perfused  Lungs: normal respiratory effort on room air  Back: No CVA tenderness  Abd: Soft, non-tender, non-distended  : No bleeding on pad. External labia wnl. Bimanual exam with no cervical motion tenderness, uterus wnl, adnexa non palpable b/l. Cervix closed.  Speculum Exam: Physiologic, non foul smelling discharge noted. No bleeding.  Ext: non-tender b/l, no edema     LABS:                        11.2   10.57 )-----------( 187      ( 11 Dec 2023 02:25 )             32.5     12-    139  |  105  |  12  ----------------------------<  105<H>  4.5   |  22  |  0.82    Ca    9.4      11 Dec 2023 02:25    TPro  6.7  /  Alb  4.4  /  TBili  0.7  /  DBili  x   /  AST  12  /  ALT  11  /  AlkPhos  40      I&O's Detail  Urinalysis Basic - ( 11 Dec 2023 02:25 )    Color: Yellow / Appearance: Clear / S.010 / pH: x  Gluc: 105 mg/dL / Ketone: Negative mg/dL  / Bili: Negative / Urobili: 0.2 mg/dL   Blood: x / Protein: 30 mg/dL / Nitrite: Negative   Leuk Esterase: Negative / RBC: 2 /HPF / WBC 2 /HPF   Sq Epi: x / Non Sq Epi: 3 /HPF / Bacteria: Negative /HPF    RADIOLOGY & ADDITIONAL STUDIES:  < from: CT Pelvis w/ IV Cont (23 @ 03:39) >  ACC: [id number] EXAM:  CT PELVIS ONLY IC   ORDERED BY:  MARY STAPLES   PROCEDURE DATE:  2023        INTERPRETATION:  CLINICAL INFORMATION: Miscarriage status post dilatation   and curettage presenting with fever and lower back pain onpostop day 3    COMPARISON: None.    CONTRAST/COMPLICATIONS:  IV Contrast: Omnipaque 300  90 cc administered   10 cc discarded  Oral Contrast: NONE  Complications: None reported at time of study completion    PROCEDURE:  CT of the Pelvis was performed.  Sagittal and coronal reformats were performed.    FINDINGS:  BLADDER: Within normal limits.  REPRODUCTIVE ORGANS: Distended endometrial canal with heterogeneous   debris, 2.9 cm AP. Scattered foci of air within the vaginal canal.  LYMPH NODES: No pelvic lymphadenopathy.    VISUALIZED PORTIONS:  ABDOMINAL ORGANS: Within normal limits.  BOWEL: Within normal limits.  PERITONEUM: No ascites or pneumoperitoneum.  VESSELS: Within normal limits.  ABDOMINAL WALL: Within normal limits.  BONES: Within normal limits.    IMPRESSION:  Distended endometrial canal with heterogeneous debris, retained products   of conception cannot be excluded. Recommend transvaginal ultrasound for   further evaluation.    --- End of Report ---     DEYVI QUINONES DO; Resident Radiologist  This document has been electronically signed.   PABLO TURK MD; Attending Radiologist  This document has been electronically signed. Dec 11 2023  4:58AM    < end of copied text >  --------------------------------------------------------------------------------------------------------  < from: US Duplex Abdomen/Pelvis Limited (. @ 08:02) >  ACC: [id number] EXAM:  US DPLX ABD PELV LTD   ORDERED BY:  MARY STAPLES   ACC: [id number] EXAM:  US TRANSVAGINAL   ORDERED BY:  MARY STAPLES   PROCEDURE DATE:  2023      INTERPRETATION:  CLINICAL INFORMATION: Dilation and curettage on Friday,   2023. Fever. Evaluate for retained products.    LMP: 2023    COMPARISON: None available.    TECHNIQUE:  Endovaginal pelvic sonogram only. Color and Spectral Doppler was   performed.    FINDINGS:  Uterus: 7.6 cm x 5.1 cm x 6.1 cm. Within normal limits.  Endometrium: 10 mm. Heterogeneous and thickened. No internal vascularity.    Right ovary: 1.4 cm x 2.9 cm x 1.3 cm. Within normal limits. Normal   arterial and venous waveforms.  Left ovary: 2.7 cm x 1.3 cm x 2.0 cm.Within normal limits. Normal   arterial and venous waveforms.    Fluid: Small free fluid in right and left adnexa.    IMPRESSION:  Heterogeneous and thickened endometrium measuring 10 mm. No internal   vascularity. Findings are suggestive of possibleretained blood clot   rather than retained products of conception, though retained products   cannot be excluded. Follow-up can be performed    --- End of Report ---    MAURA HAILE MD; Resident Radiologist  This document has been electronically signed.  MAXI JONES MD; Attending Radiologist  This document has been electronically signed. Dec 11 2023  8:45AM    < end of copied text >         **DRAFT UNTIL DISCUSSION WITH ATTENDING**    PYIUSH WEI  23y  Female [id number]    HPI: Patient is a 24yo  now POD#3 s/p scheduled suction D&C for missed  with Dr. Solano, presenting with fever, chills, body aches. Reports a Tmax at home to 102 at 10pm last night after feeling sudden onset of fevers/chills and myalgias at 5pm. The body aches initially began in the lower back and are now diffuse. Did not take any OTC medications prior to presenting to ED. She states her vaginal bleeding and cramping after the D&C subsided yesterday, she is no longer bleeding. Denies abnormal or foul smelling vaginal discharge, abdominal pain, pelvic pain. Denies respiratory congestion, CP, SOB, known sick contacts.     Name of GYN Physician: Dr. Ocasio  OBHx: MAB with D&C (2023)  GynHx: AUB, denies known fibroids, cysts, endometriosis, STI's, Abnormal pap smears   PMH: breat fibroadenoma  PSH: D&C as above  Meds: none  Allx: NKDA  Social History: Denies smoking use, drug use, alcohol use    Vital Signs Last 24 Hrs  T(C): 36.8 (11 Dec 2023 06:41), Max: 37.5 (11 Dec 2023 00:09)  T(F): 98.2 (11 Dec 2023 06:41), Max: 99.5 (11 Dec 2023 00:09)  HR: 77 (11 Dec 2023 06:41) (77 - 94)  BP: 129/61 (11 Dec 2023 06:41) (127/79 - 129/61)  RR: 18 (11 Dec 2023 06:41) (18 - 18)  SpO2: 100% (11 Dec 2023 06:41) (98% - 100%)    Parameters below as of 11 Dec 2023 06:41  Patient On (Oxygen Delivery Method): room air    Physical Exam: pt declined chaperone  General: sitting comfortably in bed, NAD   HEENT: neck supple, full ROM  CV: extremities well perfused  Lungs: normal respiratory effort on room air  Back: No CVA tenderness  Abd: Soft, non-tender, non-distended  : No bleeding on pad. External labia wnl. Bimanual exam with no cervical motion tenderness, uterus wnl, adnexa non palpable b/l. Cervix closed.  Speculum Exam: Physiologic, non foul smelling discharge noted. No bleeding.  Ext: non-tender b/l, no edema     LABS:                        11.2   10.57 )-----------( 187      ( 11 Dec 2023 02:25 )             32.5     12-    139  |  105  |  12  ----------------------------<  105<H>  4.5   |  22  |  0.82    Ca    9.4      11 Dec 2023 02:25    TPro  6.7  /  Alb  4.4  /  TBili  0.7  /  DBili  x   /  AST  12  /  ALT  11  /  AlkPhos  40      I&O's Detail  Urinalysis Basic - ( 11 Dec 2023 02:25 )    Color: Yellow / Appearance: Clear / S.010 / pH: x  Gluc: 105 mg/dL / Ketone: Negative mg/dL  / Bili: Negative / Urobili: 0.2 mg/dL   Blood: x / Protein: 30 mg/dL / Nitrite: Negative   Leuk Esterase: Negative / RBC: 2 /HPF / WBC 2 /HPF   Sq Epi: x / Non Sq Epi: 3 /HPF / Bacteria: Negative /HPF    RADIOLOGY & ADDITIONAL STUDIES:  < from: CT Pelvis w/ IV Cont (23 @ 03:39) >  ACC: [id number] EXAM:  CT PELVIS ONLY IC   ORDERED BY:  MARY STAPLES   PROCEDURE DATE:  2023        INTERPRETATION:  CLINICAL INFORMATION: Miscarriage status post dilatation   and curettage presenting with fever and lower back pain onpostop day 3    COMPARISON: None.    CONTRAST/COMPLICATIONS:  IV Contrast: Omnipaque 300  90 cc administered   10 cc discarded  Oral Contrast: NONE  Complications: None reported at time of study completion    PROCEDURE:  CT of the Pelvis was performed.  Sagittal and coronal reformats were performed.    FINDINGS:  BLADDER: Within normal limits.  REPRODUCTIVE ORGANS: Distended endometrial canal with heterogeneous   debris, 2.9 cm AP. Scattered foci of air within the vaginal canal.  LYMPH NODES: No pelvic lymphadenopathy.    VISUALIZED PORTIONS:  ABDOMINAL ORGANS: Within normal limits.  BOWEL: Within normal limits.  PERITONEUM: No ascites or pneumoperitoneum.  VESSELS: Within normal limits.  ABDOMINAL WALL: Within normal limits.  BONES: Within normal limits.    IMPRESSION:  Distended endometrial canal with heterogeneous debris, retained products   of conception cannot be excluded. Recommend transvaginal ultrasound for   further evaluation.    --- End of Report ---     DEYVI QUINONES DO; Resident Radiologist  This document has been electronically signed.   PABLO TURK MD; Attending Radiologist  This document has been electronically signed. Dec 11 2023  4:58AM    < end of copied text >  --------------------------------------------------------------------------------------------------------  < from: US Duplex Abdomen/Pelvis Limited (. @ 08:02) >  ACC: [id number] EXAM:  US DPLX ABD PELV LTD   ORDERED BY:  MARY STAPLES   ACC: [id number] EXAM:  US TRANSVAGINAL   ORDERED BY:  MARY STAPLES   PROCEDURE DATE:  2023      INTERPRETATION:  CLINICAL INFORMATION: Dilation and curettage on Friday,   2023. Fever. Evaluate for retained products.    LMP: 2023    COMPARISON: None available.    TECHNIQUE:  Endovaginal pelvic sonogram only. Color and Spectral Doppler was   performed.    FINDINGS:  Uterus: 7.6 cm x 5.1 cm x 6.1 cm. Within normal limits.  Endometrium: 10 mm. Heterogeneous and thickened. No internal vascularity.    Right ovary: 1.4 cm x 2.9 cm x 1.3 cm. Within normal limits. Normal   arterial and venous waveforms.  Left ovary: 2.7 cm x 1.3 cm x 2.0 cm.Within normal limits. Normal   arterial and venous waveforms.    Fluid: Small free fluid in right and left adnexa.    IMPRESSION:  Heterogeneous and thickened endometrium measuring 10 mm. No internal   vascularity. Findings are suggestive of possibleretained blood clot   rather than retained products of conception, though retained products   cannot be excluded. Follow-up can be performed    --- End of Report ---    MAURA HAILE MD; Resident Radiologist  This document has been electronically signed.  MAXI JONES MD; Attending Radiologist  This document has been electronically signed. Dec 11 2023  8:45AM    < end of copied text >         PIYUSH WEI  23y  Female [id number]    HPI: Patient is a 24yo  now POD#3 s/p scheduled suction D&C for missed  with Dr. Solano, presenting with fever, chills, body aches. Reports a Tmax at home to 102 at 10pm last night after feeling sudden onset of fevers/chills and myalgias at 5pm. The body aches initially began in the lower back and are now diffuse. Did not take any OTC medications prior to presenting to ED. She states her vaginal bleeding and cramping after the D&C subsided yesterday, she is no longer bleeding. Denies abnormal or foul smelling vaginal discharge, abdominal pain, pelvic pain. Denies respiratory congestion, CP, SOB, known sick contacts.     Name of GYN Physician: Dr. Ocasio  OBHx: MAB with D&C (2023)  GynHx: AUB, denies known fibroids, cysts, endometriosis, STI's, Abnormal pap smears   PMH: breat fibroadenoma  PSH: D&C as above  Meds: none  Allx: NKDA  Social History: Denies smoking use, drug use, alcohol use    Vital Signs Last 24 Hrs  T(C): 36.8 (11 Dec 2023 06:41), Max: 37.5 (11 Dec 2023 00:09)  T(F): 98.2 (11 Dec 2023 06:41), Max: 99.5 (11 Dec 2023 00:09)  HR: 77 (11 Dec 2023 06:41) (77 - 94)  BP: 129/61 (11 Dec 2023 06:41) (127/79 - 129/61)  RR: 18 (11 Dec 2023 06:41) (18 - 18)  SpO2: 100% (11 Dec 2023 06:41) (98% - 100%)    Parameters below as of 11 Dec 2023 06:41  Patient On (Oxygen Delivery Method): room air    Physical Exam: pt declined chaperone  General: sitting comfortably in bed, NAD   HEENT: neck supple, full ROM  CV: extremities well perfused  Lungs: normal respiratory effort on room air  Back: No CVA tenderness  Abd: Soft, non-tender, non-distended  : No bleeding on pad. External labia wnl. Bimanual exam with no cervical motion tenderness, uterus wnl, adnexa non palpable b/l. Cervix closed.  Speculum Exam: Physiologic, non foul smelling discharge noted. No bleeding.  Ext: non-tender b/l, no edema     LABS:                        11.2   10.57 )-----------( 187      ( 11 Dec 2023 02:25 )             32.5     12-    139  |  105  |  12  ----------------------------<  105<H>  4.5   |  22  |  0.82    Ca    9.4      11 Dec 2023 02:25    TPro  6.7  /  Alb  4.4  /  TBili  0.7  /  DBili  x   /  AST  12  /  ALT  11  /  AlkPhos  40      I&O's Detail  Urinalysis Basic - ( 11 Dec 2023 02:25 )    Color: Yellow / Appearance: Clear / S.010 / pH: x  Gluc: 105 mg/dL / Ketone: Negative mg/dL  / Bili: Negative / Urobili: 0.2 mg/dL   Blood: x / Protein: 30 mg/dL / Nitrite: Negative   Leuk Esterase: Negative / RBC: 2 /HPF / WBC 2 /HPF   Sq Epi: x / Non Sq Epi: 3 /HPF / Bacteria: Negative /HPF    RADIOLOGY & ADDITIONAL STUDIES:  < from: CT Pelvis w/ IV Cont (23 @ 03:39) >  ACC: [id number] EXAM:  CT PELVIS ONLY IC   ORDERED BY:  MARY STAPLES   PROCEDURE DATE:  2023        INTERPRETATION:  CLINICAL INFORMATION: Miscarriage status post dilatation   and curettage presenting with fever and lower back pain onpostop day 3    COMPARISON: None.    CONTRAST/COMPLICATIONS:  IV Contrast: Omnipaque 300  90 cc administered   10 cc discarded  Oral Contrast: NONE  Complications: None reported at time of study completion    PROCEDURE:  CT of the Pelvis was performed.  Sagittal and coronal reformats were performed.    FINDINGS:  BLADDER: Within normal limits.  REPRODUCTIVE ORGANS: Distended endometrial canal with heterogeneous   debris, 2.9 cm AP. Scattered foci of air within the vaginal canal.  LYMPH NODES: No pelvic lymphadenopathy.    VISUALIZED PORTIONS:  ABDOMINAL ORGANS: Within normal limits.  BOWEL: Within normal limits.  PERITONEUM: No ascites or pneumoperitoneum.  VESSELS: Within normal limits.  ABDOMINAL WALL: Within normal limits.  BONES: Within normal limits.    IMPRESSION:  Distended endometrial canal with heterogeneous debris, retained products   of conception cannot be excluded. Recommend transvaginal ultrasound for   further evaluation.    --- End of Report ---     DEYVI QUINONES DO; Resident Radiologist  This document has been electronically signed.   PABLO TURK MD; Attending Radiologist  This document has been electronically signed. Dec 11 2023  4:58AM    < end of copied text >  --------------------------------------------------------------------------------------------------------  < from: US Duplex Abdomen/Pelvis Limited (23 @ 08:02) >  ACC: [id number] EXAM:  US DPLX ABD PELV LTD   ORDERED BY:  MARY STAPLES   ACC: [id number] EXAM:  US TRANSVAGINAL   ORDERED BY:  MARY STAPLES   PROCEDURE DATE:  2023      INTERPRETATION:  CLINICAL INFORMATION: Dilation and curettage on Friday,   2023. Fever. Evaluate for retained products.    LMP: 2023    COMPARISON: None available.    TECHNIQUE:  Endovaginal pelvic sonogram only. Color and Spectral Doppler was   performed.    FINDINGS:  Uterus: 7.6 cm x 5.1 cm x 6.1 cm. Within normal limits.  Endometrium: 10 mm. Heterogeneous and thickened. No internal vascularity.    Right ovary: 1.4 cm x 2.9 cm x 1.3 cm. Within normal limits. Normal   arterial and venous waveforms.  Left ovary: 2.7 cm x 1.3 cm x 2.0 cm.Within normal limits. Normal   arterial and venous waveforms.    Fluid: Small free fluid in right and left adnexa.    IMPRESSION:  Heterogeneous and thickened endometrium measuring 10 mm. No internal   vascularity. Findings are suggestive of possibleretained blood clot   rather than retained products of conception, though retained products   cannot be excluded. Follow-up can be performed    --- End of Report ---    MAURA HAILE MD; Resident Radiologist  This document has been electronically signed.  MAXI A KAREN MD; Attending Radiologist  This document has been electronically signed. Dec 11 2023  8:45AM    < end of copied text >         PIYUSH WEI  23y  Female [id number]    HPI: Patient is a 24yo  now POD#3 s/p scheduled suction D&C for missed  with Dr. Solano, presenting with fever, chills, body aches. Reports a Tmax at home to 102 at 10pm last night after feeling sudden onset of fevers/chills and myalgias at 5pm. The body aches initially began in the lower back and are now diffuse. Did not take any OTC medications prior to presenting to ED. She states her vaginal bleeding and cramping after the D&C subsided yesterday, she is no longer bleeding. Denies abnormal or foul smelling vaginal discharge, abdominal pain, pelvic pain. Denies respiratory congestion, CP, SOB, known sick contacts.     Name of GYN Physician: Dr. Ocasio  OBHx: MAB with D&C (2023)  GynHx: AUB, denies known fibroids, cysts, endometriosis, STI's, Abnormal pap smears   PMH: breat fibroadenoma  PSH: D&C as above  Meds: none  Allx: NKDA  Social History: Denies smoking use, drug use, alcohol use    Vital Signs Last 24 Hrs  T(C): 36.8 (11 Dec 2023 06:41), Max: 37.5 (11 Dec 2023 00:09)  T(F): 98.2 (11 Dec 2023 06:41), Max: 99.5 (11 Dec 2023 00:09)  HR: 77 (11 Dec 2023 06:41) (77 - 94)  BP: 129/61 (11 Dec 2023 06:41) (127/79 - 129/61)  RR: 18 (11 Dec 2023 06:41) (18 - 18)  SpO2: 100% (11 Dec 2023 06:41) (98% - 100%)    Parameters below as of 11 Dec 2023 06:41  Patient On (Oxygen Delivery Method): room air    Physical Exam: pt declined chaperone  General: sitting comfortably in bed, NAD   HEENT: neck supple, full ROM  CV: extremities well perfused  Lungs: normal respiratory effort on room air  Back: No CVA tenderness  Abd: Soft, non-tender, non-distended  : No bleeding on pad. External labia wnl. Bimanual exam with no cervical motion tenderness, uterus wnl, adnexa non palpable b/l. Cervix closed.  Speculum Exam: Physiologic, non foul smelling discharge noted. No bleeding.  Ext: non-tender b/l, no edema     LABS:                        11.2   10.57 )-----------( 187      ( 11 Dec 2023 02:25 )             32.5     12-    139  |  105  |  12  ----------------------------<  105<H>  4.5   |  22  |  0.82    Ca    9.4      11 Dec 2023 02:25    TPro  6.7  /  Alb  4.4  /  TBili  0.7  /  DBili  x   /  AST  12  /  ALT  11  /  AlkPhos  40      I&O's Detail  Urinalysis Basic - ( 11 Dec 2023 02:25 )    Color: Yellow / Appearance: Clear / S.010 / pH: x  Gluc: 105 mg/dL / Ketone: Negative mg/dL  / Bili: Negative / Urobili: 0.2 mg/dL   Blood: x / Protein: 30 mg/dL / Nitrite: Negative   Leuk Esterase: Negative / RBC: 2 /HPF / WBC 2 /HPF   Sq Epi: x / Non Sq Epi: 3 /HPF / Bacteria: Negative /HPF    RADIOLOGY & ADDITIONAL STUDIES:  < from: CT Pelvis w/ IV Cont (23 @ 03:39) >  ACC: [id number] EXAM:  CT PELVIS ONLY IC   ORDERED BY:  MARY STAPLES   PROCEDURE DATE:  2023        INTERPRETATION:  CLINICAL INFORMATION: Miscarriage status post dilatation   and curettage presenting with fever and lower back pain onpostop day 3    COMPARISON: None.    CONTRAST/COMPLICATIONS:  IV Contrast: Omnipaque 300  90 cc administered   10 cc discarded  Oral Contrast: NONE  Complications: None reported at time of study completion    PROCEDURE:  CT of the Pelvis was performed.  Sagittal and coronal reformats were performed.    FINDINGS:  BLADDER: Within normal limits.  REPRODUCTIVE ORGANS: Distended endometrial canal with heterogeneous   debris, 2.9 cm AP. Scattered foci of air within the vaginal canal.  LYMPH NODES: No pelvic lymphadenopathy.    VISUALIZED PORTIONS:  ABDOMINAL ORGANS: Within normal limits.  BOWEL: Within normal limits.  PERITONEUM: No ascites or pneumoperitoneum.  VESSELS: Within normal limits.  ABDOMINAL WALL: Within normal limits.  BONES: Within normal limits.    IMPRESSION:  Distended endometrial canal with heterogeneous debris, retained products   of conception cannot be excluded. Recommend transvaginal ultrasound for   further evaluation.    --- End of Report ---     DEYVI QUINONES DO; Resident Radiologist  This document has been electronically signed.   PABLO TURK MD; Attending Radiologist  This document has been electronically signed. Dec 11 2023  4:58AM    < end of copied text >  --------------------------------------------------------------------------------------------------------  < from: US Duplex Abdomen/Pelvis Limited (23 @ 08:02) >  ACC: [id number] EXAM:  US DPLX ABD PELV LTD   ORDERED BY:  MARY STAPLES   ACC: [id number] EXAM:  US TRANSVAGINAL   ORDERED BY:  MARY STAPLES   PROCEDURE DATE:  2023      INTERPRETATION:  CLINICAL INFORMATION: Dilation and curettage on Friday,   2023. Fever. Evaluate for retained products.    LMP: 2023    COMPARISON: None available.    TECHNIQUE:  Endovaginal pelvic sonogram only. Color and Spectral Doppler was   performed.    FINDINGS:  Uterus: 7.6 cm x 5.1 cm x 6.1 cm. Within normal limits.  Endometrium: 10 mm. Heterogeneous and thickened. No internal vascularity.    Right ovary: 1.4 cm x 2.9 cm x 1.3 cm. Within normal limits. Normal   arterial and venous waveforms.  Left ovary: 2.7 cm x 1.3 cm x 2.0 cm.Within normal limits. Normal   arterial and venous waveforms.    Fluid: Small free fluid in right and left adnexa.    IMPRESSION:  Heterogeneous and thickened endometrium measuring 10 mm. No internal   vascularity. Findings are suggestive of possibleretained blood clot   rather than retained products of conception, though retained products   cannot be excluded. Follow-up can be performed    --- End of Report ---    MAURA HAILE MD; Resident Radiologist  This document has been electronically signed.  MAXI A KAREN MD; Attending Radiologist  This document has been electronically signed. Dec 11 2023  8:45AM    < end of copied text >

## 2023-12-11 NOTE — CONSULT NOTE ADULT - ASSESSMENT
**DRAFT** Patient is a 24yo  now POD#3 s/p scheduled suction D&C for missed  with Dr. Solano, presenting with fever, chills, body aches since last night. Since arrival in ED, patient has remained afebrile with rest of VS wnl, labs without significant leukocytosis WBC 10.6 (WBC 12.4 on day of presurgical testing), bHCG significantly downtrended from 75,468 () to 1,366 today. TVUS showing heterogenous EMS 10mm without vascular flow, most suggestive of retained blood clot rather than retained products of conception. Patient is gynecologically asymptomatic with a benign pelvic exam, extremely low suspicion for retained POCs or endometritis at this time.    - No acute GYN intervention indicated, as there is extremely low suspicion for retained POCs or endometritis  - Patient should keep previously scheduled f/u appt with Dr. Solano's office on   - Strict ED return precautions discussed: foul smelling, abnormal vaginal discharge, new pelvic pain, heavy VB saturating 2 pads in 1hr for >2hrs  - Rest of care/fever workup per primary ED team, no GYN barriers to discharge    To be discussed with covering attending, Dr. Vernon Dominguez PGY2   Patient is a 24yo  now POD#3 s/p scheduled suction D&C for missed  with Dr. Solano, presenting with fever, chills, body aches since last night. Since arrival in ED, patient has remained afebrile with rest of VS wnl, labs without significant leukocytosis WBC 10.6 (WBC 12.4 on day of presurgical testing), bHCG significantly downtrended from 75,468 () to 1,366 today. TVUS showing heterogenous EMS 10mm without vascular flow, most suggestive of retained blood clot rather than retained products of conception. Patient is gynecologically asymptomatic with a benign pelvic exam, extremely low suspicion for retained POCs or endometritis at this time.    - No acute GYN intervention indicated, as there is extremely low suspicion for retained POCs or endometritis  - Patient will get a repeat bHCG to confirm continued appropriate downtrend, Dr. Junior to task office/send lab script for Thursday of this week,   - Patient should keep previously scheduled f/u appt with Dr. Solano's office on   - Strict ED return precautions discussed: foul smelling, abnormal vaginal discharge, new pelvic pain, heavy VB saturating 2 pads in 1hr for >2hrs  - Rest of care/fever workup per primary ED team, no GYN barriers to discharge    Patient seen at bedside with covering attending, Dr. Vernon Dominguez PGY2

## 2023-12-11 NOTE — ED PROVIDER NOTE - PROGRESS NOTE DETAILS
Vaginal exam: No CMT. white discharge seen in the vaginal vault without blood or RPOC seen. Noemi CABRERA, PGY-1;    Received singout on this patient, evaluated, patient states she feels be Noemi CABRERA, PGY-1;    Received singout on this patient, evaluated, patient states she feels better since arrival. OBGYN consulted, will see patient after TVUS. Dr. Le: Received signout from Dr. Quentin Stevenson.  23-year-old female status post D&C 3 days ago here with fevers chills Tmax at home 102, body aches.  CT showed retained products of conception, pending transvaginal ultrasound.  Patient seen at bedside, well-appearing, on her way to the ultrasound.  Called lab and added on differential to the CBC, will send VBG and cultures as well.  Called GYN as well. Noemi CABRERA, PGY-1;    DIscussed case with OBGYN, recommend patient repeat B-hcg 12/14, f/u with Dr. Solano 12/20. Patient agreeable to plan.

## 2023-12-11 NOTE — ED PROVIDER NOTE - NS ED ATTENDING STATEMENT MOD
Attending with

## 2023-12-11 NOTE — ED ADULT NURSE NOTE - OBJECTIVE STATEMENT
23y female w/ pmh of miscarriage presents to ED w/ fever/chills. Pt states she had surgical procedure two days ago for a miscarriage and was discharge from hospital. Pt states she began to develop fever and chills with bodyaches last night. Pt states her temperature was 102 and she called her MD who told her to go right to the hospital. Pt states she did not take any medication to reduce the fever prior to arrival. Pt denies nausea, vomiting. Pt is ambulatory and well appearing.

## 2023-12-11 NOTE — ED PROVIDER NOTE - OBJECTIVE STATEMENT
June Mock is a 23 y.o. F PMH significant for fibroadenoma of right breast, and D+C performed 2 days ago. Presents to the ED with complaints of fever and chills, diffuse bodyaches and lower back pain onset approximately 5 PM today.  Patient had dilation and curettage on Friday.  She states having pain the day following however was otherwise doing well.  Today approximately 5 PM she had sudden onset of fever and chills, max temperature of 102, and diffuse bodyaches.  States pain initially began in the lower back, this pain is nonradiating.  She denies HA, change in vision, rhinorrhea, sore throat, CP, shortness of breath, ABD pain, changes in urination including dysuria, hematuria, vaginal discharge, diarrhea or constipation.  Denies recent travel or sick contacts.  Given recent D+C procedure patient contacted OB/GYN who recommended pt to present to ED

## 2023-12-11 NOTE — CONSULT NOTE ADULT - ATTENDING COMMENTS
agree with above  briefly, 22yo  now POD#3 s/p scheduled suction D&C for missed  with episode of fever, chills, body aches last night. Since arrival in ED, patient AF and VS wnl, labs without significant leukocytosis WBC 10.6 (WBC 12.4 on day of presurgical testing), bHCG significantly downtrended from 75,468 () to 1,366 today. TVUS showing heterogenous EMS 10mm without vascular flow, most suggestive of retained blood clot rather than retained products of conception. Patient is gynecologically asymptomatic with a benign pelvic exam, extremely low suspicion for retained POCs or endometritis at this time. No gyn intervention at this cintia. Repeat bHCG this week to confirm appropriate downtrend.    f/u appt with Dr. Solano's office on   strict precautions    huong daily MD agree with above  briefly, 24yo  now POD#3 s/p scheduled suction D&C for missed  with episode of fever, chills, body aches last night. Since arrival in ED, patient AF and VS wnl, labs without significant leukocytosis WBC 10.6 (WBC 12.4 on day of presurgical testing), bHCG significantly downtrended from 75,468 () to 1,366 today. TVUS showing heterogenous EMS 10mm without vascular flow, most suggestive of retained blood clot rather than retained products of conception. Patient is gynecologically asymptomatic with a benign pelvic exam, extremely low suspicion for retained POCs or endometritis at this time. No gyn intervention at this cintia. Repeat bHCG this week to confirm appropriate downtrend.    f/u appt with Dr. Solano's office on   strict precautions    huong daily MD
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
## 2023-12-11 NOTE — ED ADULT NURSE NOTE - NSFALLUNIVINTERV_ED_ALL_ED
Bed/Stretcher in lowest position, wheels locked, appropriate side rails in place/Call bell, personal items and telephone in reach/Instruct patient to call for assistance before getting out of bed/chair/stretcher/Non-slip footwear applied when patient is off stretcher/[place] to call system/Physically safe environment - no spills, clutter or unnecessary equipment/Purposeful proactive rounding/Room/bathroom lighting operational, light cord in reach Bed/Stretcher in lowest position, wheels locked, appropriate side rails in place/Call bell, personal items and telephone in reach/Instruct patient to call for assistance before getting out of bed/chair/stretcher/Non-slip footwear applied when patient is off stretcher/[place] to call system/Physically safe environment - no spills, clutter or unnecessary equipment/Purposeful proactive rounding/Room/bathroom lighting operational, light cord in reach

## 2023-12-11 NOTE — ED PROVIDER NOTE - ATTENDING CONTRIBUTION TO CARE
Patient presents with fever and low back pain 2 days out from a D&C for a miscarriage.  Patient denies vaginal bleeding, vaginal discharge, dysuria, but does endorse lower abdominal pain.  She denies cough, congestion, shortness of breath, but does endorse body aches.  On exam patient is well-appearing, afebrile, unremarkable vital signs, clear lungs, soft abdomen with tenderness only in the suprapubic region, no CVA tenderness.   exam as above.  Due to recent  procedure, patient will get a CT to rule out complications from the procedure, endometritis, retained products of conception, as well as labs to assess for alternate source of fever.

## 2023-12-11 NOTE — ED PROVIDER NOTE - PHYSICAL EXAMINATION
GEN: Patient awake and alert. No acute distress, non-toxic.  Head: normocephalic, atraumatic.  Neck: Nontender, full ROM.  Eyes: PERRLA b/l. EOMI  ENT:  Throat without exudates, uvula midline, no vesicles.   CARDIAC: RRR. Normal S1, S2. No murmur,  PULM: CTA B/L no wheeze  ABD: Soft, nontender, nondistended  : No CVA tenderness, no suprapubic tenderness.  MSK: Moving all extremities spontaneously  NEURO: A&Ox3,   SKIN: Warm, dry

## 2023-12-11 NOTE — ED PROVIDER NOTE - CLINICAL SUMMARY MEDICAL DECISION MAKING FREE TEXT BOX
June Mock is a 23 y.o. F PMH significant for fibroadenoma of right breast, and D+C performed 2 days ago. Presents to the ED with complaints of fever and chills, diffuse bodyaches and lower back pain onset approximately 5 PM today. Given HPI and physical exam findings, concerning differentials include but not limited to retained products of conception, UTI, pyelonephritis, URI.  Patient denies urinary complaints making UTI and pyelonephritis less likely however will perform UA to rule out UTI.  Will also obtain flu-COVID to evaluate for possible respiratory etiology.  Given recent dilation curettage will obtain CT abdomen pelvis to rule out possible iatrogenic complications of this procedure and possible products of conception.

## 2023-12-11 NOTE — ED PROVIDER NOTE - PATIENT PORTAL LINK FT
You can access the FollowMyHealth Patient Portal offered by [facility] by registering at the following website: [email or web address]. By joining [employer]’s FollowMyHealth portal, you will also be able to view your health information using other applications (apps) compatible with our system. You can access the FollowMyHealth Patient Portal offered by [facility] by registering at the following website: [email or web address]. By joining [employer]’s FollowMyHealth portal, you will also be able to view your health information using other applications (apps) compatible with our system.

## 2023-12-12 ENCOUNTER — TRANSCRIPTION ENCOUNTER (OUTPATIENT)
Age: 23
End: 2023-12-12

## 2023-12-12 NOTE — ED POST DISCHARGE NOTE - RESULT SUMMARY
Incidental/recommended f/u list: CTAP recommended TVUS to evaluate possible retained POC. Chart reviewed, pt had f/u TVUS performed as recommended. - Ubaldo Hair PA-C

## 2023-12-13 LAB
SURGICAL PATHOLOGY STUDY: SIGNIFICANT CHANGE UP
SURGICAL PATHOLOGY STUDY: SIGNIFICANT CHANGE UP

## 2023-12-14 ENCOUNTER — TRANSCRIPTION ENCOUNTER (OUTPATIENT)
Age: 23
End: 2023-12-14

## 2023-12-14 ENCOUNTER — APPOINTMENT (OUTPATIENT)
Dept: OBGYN | Facility: CLINIC | Age: 23
End: 2023-12-14
Payer: COMMERCIAL

## 2023-12-14 PROCEDURE — 36415 COLL VENOUS BLD VENIPUNCTURE: CPT

## 2023-12-15 LAB — HCG SERPL-MCNC: 286 MIU/ML

## 2023-12-16 LAB
CULTURE RESULTS: SIGNIFICANT CHANGE UP
SPECIMEN SOURCE: SIGNIFICANT CHANGE UP

## 2023-12-19 ENCOUNTER — TRANSCRIPTION ENCOUNTER (OUTPATIENT)
Age: 23
End: 2023-12-19

## 2023-12-20 ENCOUNTER — APPOINTMENT (OUTPATIENT)
Dept: OBGYN | Facility: CLINIC | Age: 23
End: 2023-12-20
Payer: COMMERCIAL

## 2023-12-20 VITALS
DIASTOLIC BLOOD PRESSURE: 65 MMHG | WEIGHT: 138 LBS | HEIGHT: 63 IN | BODY MASS INDEX: 24.45 KG/M2 | SYSTOLIC BLOOD PRESSURE: 111 MMHG

## 2023-12-20 PROCEDURE — 36415 COLL VENOUS BLD VENIPUNCTURE: CPT

## 2023-12-20 PROCEDURE — 99024 POSTOP FOLLOW-UP VISIT: CPT

## 2023-12-20 RX ORDER — NORETHINDRONE ACETATE AND ETHINYL ESTRADIOL AND FERROUS FUMARATE 1MG-20(21)
1-20 KIT ORAL
Qty: 84 | Refills: 1 | Status: ACTIVE | COMMUNITY
Start: 2022-03-07 | End: 1900-01-01

## 2023-12-20 NOTE — END OF VISIT
[FreeTextEntry2] : This note was written by Darline Carlin on 12/20/2023 actively solely USHA Ruiz M.D.  All medical record entries made by the Scribe were at my, USHA Ruiz M.D. direction and personally dictated by me on 12/20/2023. I have personally reviewed the chart and agree that the record reflects my personal performance of the history, physical exam, assessment, and plan.

## 2023-12-20 NOTE — ASSESSMENT
[Doing Well] : is doing well [de-identified] : 12/20/2023. PIYUSH WEI 23 year old female presents for post op visit, D&C s/p MAB. She feels well and offers no complaints. She denies VB, pain, fever, or chills. No abn discharge or vaginitis sxs. No urinary complaints. She has normal BM, no bloody stool. She denies abdominal or pelvic pain.

## 2023-12-20 NOTE — HISTORY OF PRESENT ILLNESS
[Pain is well-controlled] : pain is well-controlled [None] : no vaginal bleeding [Normal] : normal [Pathology reviewed] : pathology reviewed [Fever] : no fever [Chills] : no chills [Nausea] : no nausea [Vomiting] : no vomiting [de-identified] : 12/08 [de-identified] : D&C [de-identified] : MAB

## 2023-12-20 NOTE — ASSESSMENT
[Doing Well] : is doing well [de-identified] : 12/20/2023. PIYUSH WEI 23 year old female presents for post op visit, D&C s/p MAB. She feels well and offers no complaints. She denies VB, pain, fever, or chills. No abn discharge or vaginitis sxs. No urinary complaints. She has normal BM, no bloody stool. She denies abdominal or pelvic pain.

## 2023-12-20 NOTE — PLAN
[FreeTextEntry1] : 23 year old female presents for a post-op visit hysteroscopy D&C polypectomy:  Pelvic exam performed - can go back to normal routine  Pathology reviewed in detail Repeat BHCG done today  Call MD if heavy bleeding, pain, fever, or chills   Birth control maintenance: Rx renewal for Junel  D/w pt safer sexual practices including regular condom use  RTO PRN

## 2023-12-20 NOTE — HISTORY OF PRESENT ILLNESS
[Pain is well-controlled] : pain is well-controlled [None] : no vaginal bleeding [Normal] : normal [Pathology reviewed] : pathology reviewed [Fever] : no fever [Chills] : no chills [Nausea] : no nausea [Vomiting] : no vomiting [de-identified] : 12/08 [de-identified] : D&C [de-identified] : MAB

## 2023-12-20 NOTE — REASON FOR VISIT
[Post Op Day: ___] : Post-Op Day:  #[unfilled] [Procedure: ___] : Procedure: [unfilled] [Follow-Up] : a follow-up evaluation of

## 2023-12-21 ENCOUNTER — APPOINTMENT (OUTPATIENT)
Dept: OBGYN | Facility: CLINIC | Age: 23
End: 2023-12-21

## 2023-12-21 LAB — HCG SERPL-MCNC: 60 MIU/ML

## 2023-12-22 LAB
CHROM ANALY OVERALL INTERP SPEC-IMP: SIGNIFICANT CHANGE UP
CHROM ANALY OVERALL INTERP SPEC-IMP: SIGNIFICANT CHANGE UP

## 2023-12-27 ENCOUNTER — APPOINTMENT (OUTPATIENT)
Dept: OBGYN | Facility: CLINIC | Age: 23
End: 2023-12-27
Payer: COMMERCIAL

## 2023-12-27 PROCEDURE — 36415 COLL VENOUS BLD VENIPUNCTURE: CPT

## 2023-12-28 LAB — HCG SERPL-MCNC: 21 MIU/ML

## 2023-12-31 PROBLEM — Z11.3 ENCOUNTER FOR SCREENING EXAMINATION FOR SEXUALLY TRANSMITTED DISEASE: Status: RESOLVED | Noted: 2023-03-28 | Resolved: 2023-04-11

## 2024-01-03 ENCOUNTER — APPOINTMENT (OUTPATIENT)
Dept: OBGYN | Facility: CLINIC | Age: 24
End: 2024-01-03
Payer: COMMERCIAL

## 2024-01-03 PROCEDURE — 36415 COLL VENOUS BLD VENIPUNCTURE: CPT

## 2024-01-04 LAB — HCG SERPL-MCNC: 9 MIU/ML

## 2024-01-05 ENCOUNTER — EMERGENCY (EMERGENCY)
Facility: HOSPITAL | Age: 24
LOS: 1 days | Discharge: HOME | End: 2024-01-05
Attending: EMERGENCY MEDICINE
Payer: COMMERCIAL

## 2024-01-05 ENCOUNTER — NON-APPOINTMENT (OUTPATIENT)
Age: 24
End: 2024-01-05

## 2024-01-05 ENCOUNTER — TRANSCRIPTION ENCOUNTER (OUTPATIENT)
Age: 24
End: 2024-01-05

## 2024-01-05 VITALS
WEIGHT: 139.99 LBS | TEMPERATURE: 99 F | HEIGHT: 63 IN | RESPIRATION RATE: 18 BRPM | OXYGEN SATURATION: 98 % | DIASTOLIC BLOOD PRESSURE: 70 MMHG | HEART RATE: 74 BPM | SYSTOLIC BLOOD PRESSURE: 113 MMHG

## 2024-01-05 LAB
ALBUMIN SERPL ELPH-MCNC: 4.8 G/DL — SIGNIFICANT CHANGE UP (ref 3.3–5)
ALBUMIN SERPL ELPH-MCNC: 4.8 G/DL — SIGNIFICANT CHANGE UP (ref 3.3–5)
ALP SERPL-CCNC: 49 U/L — SIGNIFICANT CHANGE UP (ref 40–120)
ALP SERPL-CCNC: 49 U/L — SIGNIFICANT CHANGE UP (ref 40–120)
ALT FLD-CCNC: 19 U/L — SIGNIFICANT CHANGE UP (ref 10–45)
ALT FLD-CCNC: 19 U/L — SIGNIFICANT CHANGE UP (ref 10–45)
ANION GAP SERPL CALC-SCNC: 10 MMOL/L — SIGNIFICANT CHANGE UP (ref 5–17)
ANION GAP SERPL CALC-SCNC: 10 MMOL/L — SIGNIFICANT CHANGE UP (ref 5–17)
APPEARANCE UR: CLEAR — SIGNIFICANT CHANGE UP
APPEARANCE UR: CLEAR — SIGNIFICANT CHANGE UP
AST SERPL-CCNC: 15 U/L — SIGNIFICANT CHANGE UP (ref 10–40)
AST SERPL-CCNC: 15 U/L — SIGNIFICANT CHANGE UP (ref 10–40)
BASOPHILS # BLD AUTO: 0.04 K/UL — SIGNIFICANT CHANGE UP (ref 0–0.2)
BASOPHILS # BLD AUTO: 0.04 K/UL — SIGNIFICANT CHANGE UP (ref 0–0.2)
BASOPHILS NFR BLD AUTO: 0.4 % — SIGNIFICANT CHANGE UP (ref 0–2)
BASOPHILS NFR BLD AUTO: 0.4 % — SIGNIFICANT CHANGE UP (ref 0–2)
BILIRUB SERPL-MCNC: 0.7 MG/DL — SIGNIFICANT CHANGE UP (ref 0.2–1.2)
BILIRUB SERPL-MCNC: 0.7 MG/DL — SIGNIFICANT CHANGE UP (ref 0.2–1.2)
BILIRUB UR-MCNC: NEGATIVE — SIGNIFICANT CHANGE UP
BILIRUB UR-MCNC: NEGATIVE — SIGNIFICANT CHANGE UP
BUN SERPL-MCNC: 18 MG/DL — SIGNIFICANT CHANGE UP (ref 7–23)
BUN SERPL-MCNC: 18 MG/DL — SIGNIFICANT CHANGE UP (ref 7–23)
CALCIUM SERPL-MCNC: 9.8 MG/DL — SIGNIFICANT CHANGE UP (ref 8.4–10.5)
CALCIUM SERPL-MCNC: 9.8 MG/DL — SIGNIFICANT CHANGE UP (ref 8.4–10.5)
CHLORIDE SERPL-SCNC: 105 MMOL/L — SIGNIFICANT CHANGE UP (ref 96–108)
CHLORIDE SERPL-SCNC: 105 MMOL/L — SIGNIFICANT CHANGE UP (ref 96–108)
CO2 SERPL-SCNC: 24 MMOL/L — SIGNIFICANT CHANGE UP (ref 22–31)
CO2 SERPL-SCNC: 24 MMOL/L — SIGNIFICANT CHANGE UP (ref 22–31)
COLOR SPEC: YELLOW — SIGNIFICANT CHANGE UP
COLOR SPEC: YELLOW — SIGNIFICANT CHANGE UP
CREAT SERPL-MCNC: 0.97 MG/DL — SIGNIFICANT CHANGE UP (ref 0.5–1.3)
CREAT SERPL-MCNC: 0.97 MG/DL — SIGNIFICANT CHANGE UP (ref 0.5–1.3)
DIFF PNL FLD: ABNORMAL
DIFF PNL FLD: ABNORMAL
EGFR: 85 ML/MIN/1.73M2 — SIGNIFICANT CHANGE UP
EGFR: 85 ML/MIN/1.73M2 — SIGNIFICANT CHANGE UP
EOSINOPHIL # BLD AUTO: 0.39 K/UL — SIGNIFICANT CHANGE UP (ref 0–0.5)
EOSINOPHIL # BLD AUTO: 0.39 K/UL — SIGNIFICANT CHANGE UP (ref 0–0.5)
EOSINOPHIL NFR BLD AUTO: 3.6 % — SIGNIFICANT CHANGE UP (ref 0–6)
EOSINOPHIL NFR BLD AUTO: 3.6 % — SIGNIFICANT CHANGE UP (ref 0–6)
GLUCOSE SERPL-MCNC: 93 MG/DL — SIGNIFICANT CHANGE UP (ref 70–99)
GLUCOSE SERPL-MCNC: 93 MG/DL — SIGNIFICANT CHANGE UP (ref 70–99)
GLUCOSE UR QL: NEGATIVE MG/DL — SIGNIFICANT CHANGE UP
GLUCOSE UR QL: NEGATIVE MG/DL — SIGNIFICANT CHANGE UP
HCG SERPL-ACNC: 4.3 MIU/ML — HIGH
HCG SERPL-ACNC: 4.3 MIU/ML — HIGH
HCT VFR BLD CALC: 35.2 % — SIGNIFICANT CHANGE UP (ref 34.5–45)
HCT VFR BLD CALC: 35.2 % — SIGNIFICANT CHANGE UP (ref 34.5–45)
HGB BLD-MCNC: 12.1 G/DL — SIGNIFICANT CHANGE UP (ref 11.5–15.5)
HGB BLD-MCNC: 12.1 G/DL — SIGNIFICANT CHANGE UP (ref 11.5–15.5)
IMM GRANULOCYTES NFR BLD AUTO: 0.4 % — SIGNIFICANT CHANGE UP (ref 0–0.9)
IMM GRANULOCYTES NFR BLD AUTO: 0.4 % — SIGNIFICANT CHANGE UP (ref 0–0.9)
KETONES UR-MCNC: NEGATIVE MG/DL — SIGNIFICANT CHANGE UP
KETONES UR-MCNC: NEGATIVE MG/DL — SIGNIFICANT CHANGE UP
LEUKOCYTE ESTERASE UR-ACNC: NEGATIVE — SIGNIFICANT CHANGE UP
LEUKOCYTE ESTERASE UR-ACNC: NEGATIVE — SIGNIFICANT CHANGE UP
LYMPHOCYTES # BLD AUTO: 29.5 % — SIGNIFICANT CHANGE UP (ref 13–44)
LYMPHOCYTES # BLD AUTO: 29.5 % — SIGNIFICANT CHANGE UP (ref 13–44)
LYMPHOCYTES # BLD AUTO: 3.22 K/UL — SIGNIFICANT CHANGE UP (ref 1–3.3)
LYMPHOCYTES # BLD AUTO: 3.22 K/UL — SIGNIFICANT CHANGE UP (ref 1–3.3)
MCHC RBC-ENTMCNC: 31 PG — SIGNIFICANT CHANGE UP (ref 27–34)
MCHC RBC-ENTMCNC: 31 PG — SIGNIFICANT CHANGE UP (ref 27–34)
MCHC RBC-ENTMCNC: 34.4 GM/DL — SIGNIFICANT CHANGE UP (ref 32–36)
MCHC RBC-ENTMCNC: 34.4 GM/DL — SIGNIFICANT CHANGE UP (ref 32–36)
MCV RBC AUTO: 90.3 FL — SIGNIFICANT CHANGE UP (ref 80–100)
MCV RBC AUTO: 90.3 FL — SIGNIFICANT CHANGE UP (ref 80–100)
MONOCYTES # BLD AUTO: 0.72 K/UL — SIGNIFICANT CHANGE UP (ref 0–0.9)
MONOCYTES # BLD AUTO: 0.72 K/UL — SIGNIFICANT CHANGE UP (ref 0–0.9)
MONOCYTES NFR BLD AUTO: 6.6 % — SIGNIFICANT CHANGE UP (ref 2–14)
MONOCYTES NFR BLD AUTO: 6.6 % — SIGNIFICANT CHANGE UP (ref 2–14)
NEUTROPHILS # BLD AUTO: 6.51 K/UL — SIGNIFICANT CHANGE UP (ref 1.8–7.4)
NEUTROPHILS # BLD AUTO: 6.51 K/UL — SIGNIFICANT CHANGE UP (ref 1.8–7.4)
NEUTROPHILS NFR BLD AUTO: 59.5 % — SIGNIFICANT CHANGE UP (ref 43–77)
NEUTROPHILS NFR BLD AUTO: 59.5 % — SIGNIFICANT CHANGE UP (ref 43–77)
NITRITE UR-MCNC: NEGATIVE — SIGNIFICANT CHANGE UP
NITRITE UR-MCNC: NEGATIVE — SIGNIFICANT CHANGE UP
NRBC # BLD: 0 /100 WBCS — SIGNIFICANT CHANGE UP (ref 0–0)
NRBC # BLD: 0 /100 WBCS — SIGNIFICANT CHANGE UP (ref 0–0)
PH UR: 5.5 — SIGNIFICANT CHANGE UP (ref 5–8)
PH UR: 5.5 — SIGNIFICANT CHANGE UP (ref 5–8)
PLATELET # BLD AUTO: 189 K/UL — SIGNIFICANT CHANGE UP (ref 150–400)
PLATELET # BLD AUTO: 189 K/UL — SIGNIFICANT CHANGE UP (ref 150–400)
POTASSIUM SERPL-MCNC: 4 MMOL/L — SIGNIFICANT CHANGE UP (ref 3.5–5.3)
POTASSIUM SERPL-MCNC: 4 MMOL/L — SIGNIFICANT CHANGE UP (ref 3.5–5.3)
POTASSIUM SERPL-SCNC: 4 MMOL/L — SIGNIFICANT CHANGE UP (ref 3.5–5.3)
POTASSIUM SERPL-SCNC: 4 MMOL/L — SIGNIFICANT CHANGE UP (ref 3.5–5.3)
PROT SERPL-MCNC: 7.5 G/DL — SIGNIFICANT CHANGE UP (ref 6–8.3)
PROT SERPL-MCNC: 7.5 G/DL — SIGNIFICANT CHANGE UP (ref 6–8.3)
PROT UR-MCNC: NEGATIVE MG/DL — SIGNIFICANT CHANGE UP
PROT UR-MCNC: NEGATIVE MG/DL — SIGNIFICANT CHANGE UP
RBC # BLD: 3.9 M/UL — SIGNIFICANT CHANGE UP (ref 3.8–5.2)
RBC # BLD: 3.9 M/UL — SIGNIFICANT CHANGE UP (ref 3.8–5.2)
RBC # FLD: 12 % — SIGNIFICANT CHANGE UP (ref 10.3–14.5)
RBC # FLD: 12 % — SIGNIFICANT CHANGE UP (ref 10.3–14.5)
SODIUM SERPL-SCNC: 139 MMOL/L — SIGNIFICANT CHANGE UP (ref 135–145)
SODIUM SERPL-SCNC: 139 MMOL/L — SIGNIFICANT CHANGE UP (ref 135–145)
SP GR SPEC: 1.02 — SIGNIFICANT CHANGE UP (ref 1–1.03)
SP GR SPEC: 1.02 — SIGNIFICANT CHANGE UP (ref 1–1.03)
UROBILINOGEN FLD QL: 0.2 MG/DL — SIGNIFICANT CHANGE UP (ref 0.2–1)
UROBILINOGEN FLD QL: 0.2 MG/DL — SIGNIFICANT CHANGE UP (ref 0.2–1)
WBC # BLD: 10.92 K/UL — HIGH (ref 3.8–10.5)
WBC # BLD: 10.92 K/UL — HIGH (ref 3.8–10.5)
WBC # FLD AUTO: 10.92 K/UL — HIGH (ref 3.8–10.5)
WBC # FLD AUTO: 10.92 K/UL — HIGH (ref 3.8–10.5)

## 2024-01-05 PROCEDURE — 99285 EMERGENCY DEPT VISIT HI MDM: CPT

## 2024-01-05 NOTE — ED PROVIDER NOTE - CARE PROVIDER_API CALL
Sudha Ocasio  Obstetrics and Gynecology  [address] [place] [address], [address]  [place] [address]  Phone: [phone]  Fax: [phone]  Follow Up Time:    Sudha Ocasio  Obstetrics and Gynecology  [address] [place] [address], [address]  [place] [address]  Phone: [phone]  Fax: [phone]  Follow Up Time:

## 2024-01-05 NOTE — ED PROVIDER NOTE - CARE PROVIDERS DIRECT ADDRESSES
,[email or web address]

## 2024-01-05 NOTE — ED ADULT NURSE NOTE - OBJECTIVE STATEMENT
22 y/o F A&Ox4 with no significant PMH. Pt presents to the ED c/o vaginal bleeding. Pt reports she had a D&C procedure performed on 12/8/23. Pt endorses some spotting since then however, reports vaginal bleeding has increased and was advised by her provider to come to ED for further work up. Pt endorses she was started on OCP in December. Denies chest pain, SOB, n/v/d, lightheadedness, dizziness, fever, chills. IV access established; 20 G R AC. Patient safety maintained, bed is in lowest position, wheels locked, and side rails raised.

## 2024-01-05 NOTE — ED PROVIDER NOTE - PROGRESS NOTE DETAILS
Caridad-  exam with nurse Rob - no evidence of active hemorrhage at this time. Eugene Espitia MD (PGY3): Labs and TVUS non-actionable. Patient evaluated by OB. No acute intervention at this time.

## 2024-01-05 NOTE — ED PROVIDER NOTE - PATIENT PORTAL LINK FT
You can access the FollowMyHealth Patient Portal offered by [facility] by registering at the following website: [email or web address]. By joining [employer]’s FollowMyHealth portal, you will also be able to view your health information using other applications (apps) compatible with our system. You can access the FollowMyHealth Patient Portal offered by [facility] by registering at the following website: [email or web address]. By joining [employer]’s FollowMyHealth portal, you will also be able to view your health information using other applications (apps) compatible with our system.

## 2024-01-05 NOTE — ED PROVIDER NOTE - CLINICAL SUMMARY MEDICAL DECISION MAKING FREE TEXT BOX
Attending note.  Patient was seen in room #17.  Patient is complaining of heavy vaginal bleeding using almost an entire box of tampons today.  Patient had onset of bleeding on January 2.  Patient had a 9-week pregnancy with D&C on December 8.  She was subsequently seen in the emergency department on December 11.  She denies any fevers, chills or sweats.  She is not bleeding from any other site.  Patient called her OB/GYN today who advised to go to the emergency department if she had increased bleeding.  Patient was started on OCPs in mid December.  She just started week 3 of the OCP pack in the last few days.  She denies any other significant past medical history, takes no other medications.  She has no allergies to medications.    ROS - as above, otherwise neg.   P/E - alert, NAD, no pallor or jaundice, PERRL 3 mm, moist mm, skin - warm and dry, Lungs - clear, no w/r/r, good BS bilaterally, Cor - rr, no M, no rub, Abdo - ND, soft, NT, no HSM, no CVAT, no guarding or rebound. Extremities - no edema, no calf tenderness, distal pulses intact and symmetrical, Neuro - intact and non-focal.  Pelvic examination deferred until chaperone available.      A/P - Vaginal bleeding 1 month status post D&C for 9-week pregnancy on December 8.  Patient is currently taking OCPs.  Possible hormonal disruption.  Ultrasound to rule out retained products.  hCG, CBC, OB/GYN consultation.

## 2024-01-05 NOTE — ED PROVIDER NOTE - DIFFERENTIAL DIAGNOSIS
Patient with vaginal bleeding status post D&C with differential not limited to hormonal disruption versus retained products versus new pregnancy with miscarriage Differential Diagnosis

## 2024-01-05 NOTE — ED PROVIDER NOTE - NSFOLLOWUPINSTRUCTIONS_ED_ALL_ED_FT
You were seen in the Emergency Department for vaginal bleeding. Lab and imaging results, if performed, were discussed with you along with your discharge diagnosis.    Follow with your Gynecologist within the next 7 days. Follow up with your doctor in 1 week - bring copies of your results if you were given. If you do not have a primary doctor, please call [phone] to find one convenient for you.    Continue all prescribed medications.     To control your pain at home, you should take Ibuprofen 400 mg along with Tylenol 650mg-1000mg every 6 to 8 hours. Limit your maximum daily Tylenol from all sources to 4000mg. Be aware that many other medications contain acetaminophen which is also known as Tylenol. Taking Tylenol and Ibuprofen together has been shown to be more effective at relieving pain than taking them separately. These are both over the counter medications that you can  at your local pharmacy without a prescription. You need to respect all of the warnings on the bottles. You shouldn’t take these medications for more than a week without following up with your doctor. Both medications come with certain risks and side effects that you need to discuss with your doctor, especially if you are taking them for a prolonged period.    Return to ED for any new or worsening symptoms including but not limited to: development of chest pain, shortness of breath, fever, vomiting, focal numbness, weakness or tingling, any severe CP, headache, abdominal pain, back pain.      Rest and keep yourself hydrated with fluids. You were seen in the Emergency Department for vaginal bleeding. Lab and imaging results, if performed, were discussed with you along with your discharge diagnosis.    Follow with your Gynecologist within the next 7 days. Follow up with your doctor in 1 week - bring copies of your results if you were given. If you do not have a primary doctor, please call [phone] to find one convenient for you.    Continue all prescribed medications.     To control your pain at home, you should take Ibuprofen 400 mg along with Tylenol 650mg-1000mg every 6 to 8 hours. Limit your maximum daily Tylenol from all sources to 4000mg. Be aware that many other medications contain acetaminophen which is also known as Tylenol. Taking Tylenol and Ibuprofen together has been shown to be more effective at relieving pain than taking them separately. These are both over the counter medications that you can  at your local pharmacy without a prescription. You need to respect all of the warnings on the bottles. You shouldn’t take these medications for more than a week without following up with your doctor. Both medications come with certain risks and side effects that you need to discuss with your doctor, especially if you are taking them for a prolonged period.    Return to ED for any new or worsening symptoms including but not limited to: development of chest pain, shortness of breath, fever, vomiting, focal numbness, weakness or tingling, any severe CP, headache, abdominal pain, back pain.      Rest and keep yourself hydrated with fluids.

## 2024-01-06 VITALS
RESPIRATION RATE: 18 BRPM | SYSTOLIC BLOOD PRESSURE: 134 MMHG | DIASTOLIC BLOOD PRESSURE: 74 MMHG | TEMPERATURE: 98 F | HEART RATE: 78 BPM | OXYGEN SATURATION: 99 %

## 2024-01-06 LAB
BACTERIA # UR AUTO: NEGATIVE /HPF — SIGNIFICANT CHANGE UP
BACTERIA # UR AUTO: NEGATIVE /HPF — SIGNIFICANT CHANGE UP
CAST: 2 /LPF — SIGNIFICANT CHANGE UP (ref 0–4)
CAST: 2 /LPF — SIGNIFICANT CHANGE UP (ref 0–4)
HCT VFR BLD CALC: 31.7 % — LOW (ref 34.5–45)
HCT VFR BLD CALC: 31.7 % — LOW (ref 34.5–45)
HGB BLD-MCNC: 10.9 G/DL — LOW (ref 11.5–15.5)
HGB BLD-MCNC: 10.9 G/DL — LOW (ref 11.5–15.5)
MCHC RBC-ENTMCNC: 31 PG — SIGNIFICANT CHANGE UP (ref 27–34)
MCHC RBC-ENTMCNC: 31 PG — SIGNIFICANT CHANGE UP (ref 27–34)
MCHC RBC-ENTMCNC: 34.4 GM/DL — SIGNIFICANT CHANGE UP (ref 32–36)
MCHC RBC-ENTMCNC: 34.4 GM/DL — SIGNIFICANT CHANGE UP (ref 32–36)
MCV RBC AUTO: 90.1 FL — SIGNIFICANT CHANGE UP (ref 80–100)
MCV RBC AUTO: 90.1 FL — SIGNIFICANT CHANGE UP (ref 80–100)
NRBC # BLD: 0 /100 WBCS — SIGNIFICANT CHANGE UP (ref 0–0)
NRBC # BLD: 0 /100 WBCS — SIGNIFICANT CHANGE UP (ref 0–0)
PLATELET # BLD AUTO: 155 K/UL — SIGNIFICANT CHANGE UP (ref 150–400)
PLATELET # BLD AUTO: 155 K/UL — SIGNIFICANT CHANGE UP (ref 150–400)
RBC # BLD: 3.52 M/UL — LOW (ref 3.8–5.2)
RBC # BLD: 3.52 M/UL — LOW (ref 3.8–5.2)
RBC # FLD: 12.2 % — SIGNIFICANT CHANGE UP (ref 10.3–14.5)
RBC # FLD: 12.2 % — SIGNIFICANT CHANGE UP (ref 10.3–14.5)
RBC CASTS # UR COMP ASSIST: 2 /HPF — SIGNIFICANT CHANGE UP (ref 0–4)
RBC CASTS # UR COMP ASSIST: 2 /HPF — SIGNIFICANT CHANGE UP (ref 0–4)
REVIEW: SIGNIFICANT CHANGE UP
REVIEW: SIGNIFICANT CHANGE UP
SQUAMOUS # UR AUTO: 2 /HPF — SIGNIFICANT CHANGE UP (ref 0–5)
SQUAMOUS # UR AUTO: 2 /HPF — SIGNIFICANT CHANGE UP (ref 0–5)
WBC # BLD: 8.2 K/UL — SIGNIFICANT CHANGE UP (ref 3.8–10.5)
WBC # BLD: 8.2 K/UL — SIGNIFICANT CHANGE UP (ref 3.8–10.5)
WBC # FLD AUTO: 8.2 K/UL — SIGNIFICANT CHANGE UP (ref 3.8–10.5)
WBC # FLD AUTO: 8.2 K/UL — SIGNIFICANT CHANGE UP (ref 3.8–10.5)
WBC UR QL: 3 /HPF — SIGNIFICANT CHANGE UP (ref 0–5)
WBC UR QL: 3 /HPF — SIGNIFICANT CHANGE UP (ref 0–5)

## 2024-01-06 PROCEDURE — 85027 COMPLETE CBC AUTOMATED: CPT

## 2024-01-06 PROCEDURE — 86901 BLOOD TYPING SEROLOGIC RH(D): CPT

## 2024-01-06 PROCEDURE — 81001 URINALYSIS AUTO W/SCOPE: CPT

## 2024-01-06 PROCEDURE — 87086 URINE CULTURE/COLONY COUNT: CPT

## 2024-01-06 PROCEDURE — 36415 COLL VENOUS BLD VENIPUNCTURE: CPT

## 2024-01-06 PROCEDURE — 86850 RBC ANTIBODY SCREEN: CPT

## 2024-01-06 PROCEDURE — 85025 COMPLETE CBC W/AUTO DIFF WBC: CPT

## 2024-01-06 PROCEDURE — 84702 CHORIONIC GONADOTROPIN TEST: CPT

## 2024-01-06 PROCEDURE — 76830 TRANSVAGINAL US NON-OB: CPT | Mod: 26

## 2024-01-06 PROCEDURE — 86900 BLOOD TYPING SEROLOGIC ABO: CPT

## 2024-01-06 PROCEDURE — 76830 TRANSVAGINAL US NON-OB: CPT

## 2024-01-06 PROCEDURE — 80053 COMPREHEN METABOLIC PANEL: CPT

## 2024-01-06 PROCEDURE — 99284 EMERGENCY DEPT VISIT MOD MDM: CPT | Mod: 25

## 2024-01-06 NOTE — CONSULT NOTE ADULT - SUBJECTIVE AND OBJECTIVE BOX
PIYUSH WEI  23y  Female [id number]    HPI: 22yo  s/p suction D&C for missed  on 23 presents for evaluation of vaginal bleeding. Patient reports she had some spotting after surgery, but then the bleeding stopped. Bleeding started again yesterday, and she saturated 3 tampons in 1.5 hours. She then started wearing pads, and soaked through 1 pad in 3 hours. She has used 2 pads since arriving at the ED. She denies abdominal pain, fevers, chills, chest pain, SOB, nausea, vomiting, urinary or bowel complaints. She has been taking Junel daily since surgery.     Name of GYN Physician: Dr. Ocasio  OBHx: MAB with D&C (2023)  GynHx: AUB, denies known fibroids, cysts, endometriosis, STI's, Abnormal pap smears   PMH: breat fibroadenoma  PSH: D&C as above  Meds: none  Allx: NKDA  Social History: Denies smoking use, drug use, alcohol use    Vital Signs Last 24 Hrs  T(C): 36.7 (2024 22:35), Max: 37 (2024 17:52)  T(F): 98 (2024 22:35), Max: 98.6 (2024 17:52)  HR: 83 (2024 22:35) (74 - 83)  BP: 119/69 (2024 22:35) (113/70 - 119/69)  BP(mean): 83 (2024 22:35) (83 - 83)  RR: 18 (2024 22:35) (18 - 18)  SpO2: 100% (2024 22:35) (98% - 100%)    Physical Exam:   General: sitting comfortably in bed, NAD   CV: RR S1S2 no m/r/g  Lungs: CTA b/l, good air flow b/l   Abd: Soft, non-tender, non-distended.  Bowel sounds present.    : Scant streaking of dark red blood on pad that was last changed 4 hours ago, no active bleeding visualized. External labia wnl.    Ext: non-tender b/l, no edema     LABS:    bHC.3    T&S: O+                          10.9   8.20  )-----------( 155      ( 2024 02:25 )             31.7         139  |  105  |  18  ----------------------------<  93  4.0   |  24  |  0.97    Ca    9.8      2024 23:04    TPro  7.5  /  Alb  4.8  /  TBili  0.7  /  DBili  x   /  AST  15  /  ALT  19  /  AlkPhos  49      I&O's Detail      Urinalysis Basic - ( 2024 23:42 )    Color: Yellow / Appearance: Clear / S.025 / pH: x  Gluc: x / Ketone: Negative mg/dL  / Bili: Negative / Urobili: 0.2 mg/dL   Blood: x / Protein: Negative mg/dL / Nitrite: Negative   Leuk Esterase: Negative / RBC: 2 /HPF / WBC 3 /HPF   Sq Epi: x / Non Sq Epi: 2 /HPF / Bacteria: Negative /HPF        RADIOLOGY & ADDITIONAL STUDIES:  TVUS FINDINGS:  Uterus: 6.5 x 3.5 x 4.1 cm. Within normal limits.  Endometrium: 3 mm. Within normal limits.    Right ovary: 1.8 x 1.8 x 2.8 cm. Within normal limits.  Left ovary:3.2 x 1.4 x 2.2 cm. Within normal limits.    Fluid: Trace free fluid adjacent to the right ovary.    IMPRESSION:  Normal pelvic sonogram.   PIYUSH WEI  23y  Female [id number]    HPI: 22yo  s/p suction D&C for missed  on 23 presents for evaluation of vaginal bleeding. Patient reports she had some spotting after surgery, but then the bleeding stopped. Bleeding started again yesterday, and she saturated 3 tampons in 1.5 hours. She then started wearing pads, and soaked through 1 pad in 3 hours. She has used 2 pads since arriving at the ED. She denies abdominal pain, fevers, chills, chest pain, SOB, nausea, vomiting, urinary or bowel complaints. She has been taking Junel daily since surgery.     Name of GYN Physician: Dr. Ocasio  OBHx: MAB with D&C (2023)  GynHx: AUB, denies known fibroids, cysts, endometriosis, STI's, Abnormal pap smears   PMH: breat fibroadenoma  PSH: D&C as above  Meds: none  Allx: NKDA  Social History: Denies smoking use, drug use, alcohol use    Vital Signs Last 24 Hrs  T(C): 36.7 (2024 22:35), Max: 37 (2024 17:52)  T(F): 98 (2024 22:35), Max: 98.6 (2024 17:52)  HR: 83 (2024 22:35) (74 - 83)  BP: 119/69 (2024 22:35) (113/70 - 119/69)  BP(mean): 83 (2024 22:35) (83 - 83)  RR: 18 (2024 22:35) (18 - 18)  SpO2: 100% (2024 22:35) (98% - 100%)    Physical Exam:   General: sitting comfortably in bed, NAD   CV: RR S1S2 no m/r/g  Lungs: CTA b/l, good air flow b/l   Abd: Soft, non-tender, non-distended.  Bowel sounds present.    : Scant streaking of dark red blood on pad that was last changed 4 hours ago, no active bleeding visualized. External labia wnl.    Ext: non-tender b/l, no edema     LABS:    bHC.3    T&S: O+                          10.9   8.20  )-----------( 155      ( 2024 02:25 )             31.7         139  |  105  |  18  ----------------------------<  93  4.0   |  24  |  0.97    Ca    9.8      2024 23:04    TPro  7.5  /  Alb  4.8  /  TBili  0.7  /  DBili  x   /  AST  15  /  ALT  19  /  AlkPhos  49      I&O's Detail      Urinalysis Basic - ( 2024 23:42 )    Color: Yellow / Appearance: Clear / S.025 / pH: x  Gluc: x / Ketone: Negative mg/dL  / Bili: Negative / Urobili: 0.2 mg/dL   Blood: x / Protein: Negative mg/dL / Nitrite: Negative   Leuk Esterase: Negative / RBC: 2 /HPF / WBC 3 /HPF   Sq Epi: x / Non Sq Epi: 2 /HPF / Bacteria: Negative /HPF        RADIOLOGY & ADDITIONAL STUDIES:  TVUS FINDINGS:  Uterus: 6.5 x 3.5 x 4.1 cm. Within normal limits.  Endometrium: 3 mm. Within normal limits.    Right ovary: 1.8 x 1.8 x 2.8 cm. Within normal limits.  Left ovary:3.2 x 1.4 x 2.2 cm. Within normal limits.    Fluid: Trace free fluid adjacent to the right ovary.    IMPRESSION:  Normal pelvic sonogram.

## 2024-01-06 NOTE — CONSULT NOTE ADULT - ASSESSMENT
22yo  s/p suction D&C for missed  on 23 presents for evaluation of vaginal bleeding. Patient clinically and hemodynamically stable, physical exam benign.    Recommendations  - Labs and TVUS reviewed and wnl  - Likely dysfunctional uterine bleeding vs. return of menses   - Continue Junel daily as prescribed for contraception/AUB  - Emergency return precautions discussed in detail. Patient verbalized understanding. All questions answered thoroughly.    d/w Dr. Vernon Holland PGY4

## 2024-01-07 LAB
CULTURE RESULTS: SIGNIFICANT CHANGE UP
CULTURE RESULTS: SIGNIFICANT CHANGE UP
SPECIMEN SOURCE: SIGNIFICANT CHANGE UP
SPECIMEN SOURCE: SIGNIFICANT CHANGE UP

## 2024-01-10 ENCOUNTER — APPOINTMENT (OUTPATIENT)
Dept: OBGYN | Facility: CLINIC | Age: 24
End: 2024-01-10
Payer: COMMERCIAL

## 2024-01-10 PROCEDURE — 36415 COLL VENOUS BLD VENIPUNCTURE: CPT

## 2024-01-11 LAB — HCG SERPL-MCNC: 5 MIU/ML

## 2024-01-12 ENCOUNTER — NON-APPOINTMENT (OUTPATIENT)
Age: 24
End: 2024-01-12

## 2024-01-16 ENCOUNTER — NON-APPOINTMENT (OUTPATIENT)
Age: 24
End: 2024-01-16

## 2024-01-17 ENCOUNTER — TRANSCRIPTION ENCOUNTER (OUTPATIENT)
Age: 24
End: 2024-01-17

## 2024-03-11 ENCOUNTER — EMERGENCY (EMERGENCY)
Facility: HOSPITAL | Age: 24
LOS: 1 days | Discharge: HOME | End: 2024-03-11
Attending: EMERGENCY MEDICINE
Payer: COMMERCIAL

## 2024-03-11 VITALS
WEIGHT: 134.92 LBS | OXYGEN SATURATION: 98 % | HEART RATE: 99 BPM | HEIGHT: 63 IN | RESPIRATION RATE: 19 BRPM | SYSTOLIC BLOOD PRESSURE: 118 MMHG | TEMPERATURE: 99 F | DIASTOLIC BLOOD PRESSURE: 75 MMHG

## 2024-03-11 PROCEDURE — 99284 EMERGENCY DEPT VISIT MOD MDM: CPT

## 2024-03-11 NOTE — ED ADULT TRIAGE NOTE - CHIEF COMPLAINT QUOTE
+home pregnancy test Thursday, LMP 1/30, c/o vaginal bleeding and abd pain since Friday  hx miscarriage in December

## 2024-03-12 VITALS
SYSTOLIC BLOOD PRESSURE: 106 MMHG | RESPIRATION RATE: 17 BRPM | DIASTOLIC BLOOD PRESSURE: 58 MMHG | TEMPERATURE: 98 F | HEART RATE: 98 BPM | OXYGEN SATURATION: 99 %

## 2024-03-12 LAB
ALBUMIN SERPL ELPH-MCNC: 4.3 G/DL — SIGNIFICANT CHANGE UP (ref 3.3–5)
ALP SERPL-CCNC: 43 U/L — SIGNIFICANT CHANGE UP (ref 40–120)
ALT FLD-CCNC: 10 U/L — SIGNIFICANT CHANGE UP (ref 10–45)
ANION GAP SERPL CALC-SCNC: 9 MMOL/L — SIGNIFICANT CHANGE UP (ref 5–17)
APPEARANCE UR: CLEAR — SIGNIFICANT CHANGE UP
APTT BLD: 32.6 SEC — SIGNIFICANT CHANGE UP (ref 24.5–35.6)
AST SERPL-CCNC: 15 U/L — SIGNIFICANT CHANGE UP (ref 10–40)
BACTERIA # UR AUTO: NEGATIVE /HPF — SIGNIFICANT CHANGE UP
BASOPHILS # BLD AUTO: 0.03 K/UL — SIGNIFICANT CHANGE UP (ref 0–0.2)
BASOPHILS NFR BLD AUTO: 0.3 % — SIGNIFICANT CHANGE UP (ref 0–2)
BILIRUB SERPL-MCNC: 0.9 MG/DL — SIGNIFICANT CHANGE UP (ref 0.2–1.2)
BILIRUB UR-MCNC: NEGATIVE — SIGNIFICANT CHANGE UP
BLD GP AB SCN SERPL QL: NEGATIVE — SIGNIFICANT CHANGE UP
BUN SERPL-MCNC: 9 MG/DL — SIGNIFICANT CHANGE UP (ref 7–23)
CALCIUM SERPL-MCNC: 9.1 MG/DL — SIGNIFICANT CHANGE UP (ref 8.4–10.5)
CAST: 0 /LPF — SIGNIFICANT CHANGE UP (ref 0–4)
CHLORIDE SERPL-SCNC: 103 MMOL/L — SIGNIFICANT CHANGE UP (ref 96–108)
CO2 SERPL-SCNC: 23 MMOL/L — SIGNIFICANT CHANGE UP (ref 22–31)
COLOR SPEC: YELLOW — SIGNIFICANT CHANGE UP
CREAT SERPL-MCNC: 0.65 MG/DL — SIGNIFICANT CHANGE UP (ref 0.5–1.3)
DIFF PNL FLD: ABNORMAL
EGFR: 128 ML/MIN/1.73M2 — SIGNIFICANT CHANGE UP
EOSINOPHIL # BLD AUTO: 0.18 K/UL — SIGNIFICANT CHANGE UP (ref 0–0.5)
EOSINOPHIL NFR BLD AUTO: 2.1 % — SIGNIFICANT CHANGE UP (ref 0–6)
GLUCOSE SERPL-MCNC: 110 MG/DL — HIGH (ref 70–99)
GLUCOSE UR QL: NEGATIVE MG/DL — SIGNIFICANT CHANGE UP
HCG SERPL-ACNC: HIGH MIU/ML
HCT VFR BLD CALC: 32.8 % — LOW (ref 34.5–45)
HGB BLD-MCNC: 12 G/DL — SIGNIFICANT CHANGE UP (ref 11.5–15.5)
IMM GRANULOCYTES NFR BLD AUTO: 0.3 % — SIGNIFICANT CHANGE UP (ref 0–0.9)
INR BLD: 1.27 RATIO — HIGH (ref 0.85–1.18)
KETONES UR-MCNC: 15 MG/DL
LEUKOCYTE ESTERASE UR-ACNC: ABNORMAL
LYMPHOCYTES # BLD AUTO: 1.04 K/UL — SIGNIFICANT CHANGE UP (ref 1–3.3)
LYMPHOCYTES # BLD AUTO: 11.9 % — LOW (ref 13–44)
MCHC RBC-ENTMCNC: 30.9 PG — SIGNIFICANT CHANGE UP (ref 27–34)
MCHC RBC-ENTMCNC: 36.6 GM/DL — HIGH (ref 32–36)
MCV RBC AUTO: 84.5 FL — SIGNIFICANT CHANGE UP (ref 80–100)
MONOCYTES # BLD AUTO: 0.54 K/UL — SIGNIFICANT CHANGE UP (ref 0–0.9)
MONOCYTES NFR BLD AUTO: 6.2 % — SIGNIFICANT CHANGE UP (ref 2–14)
NEUTROPHILS # BLD AUTO: 6.93 K/UL — SIGNIFICANT CHANGE UP (ref 1.8–7.4)
NEUTROPHILS NFR BLD AUTO: 79.2 % — HIGH (ref 43–77)
NITRITE UR-MCNC: NEGATIVE — SIGNIFICANT CHANGE UP
NRBC # BLD: 0 /100 WBCS — SIGNIFICANT CHANGE UP (ref 0–0)
PH UR: 7 — SIGNIFICANT CHANGE UP (ref 5–8)
PLATELET # BLD AUTO: 166 K/UL — SIGNIFICANT CHANGE UP (ref 150–400)
POTASSIUM SERPL-MCNC: 4.2 MMOL/L — SIGNIFICANT CHANGE UP (ref 3.5–5.3)
POTASSIUM SERPL-SCNC: 4.2 MMOL/L — SIGNIFICANT CHANGE UP (ref 3.5–5.3)
PROT SERPL-MCNC: 6.8 G/DL — SIGNIFICANT CHANGE UP (ref 6–8.3)
PROT UR-MCNC: NEGATIVE MG/DL — SIGNIFICANT CHANGE UP
PROTHROM AB SERPL-ACNC: 13.9 SEC — HIGH (ref 9.5–13)
RBC # BLD: 3.88 M/UL — SIGNIFICANT CHANGE UP (ref 3.8–5.2)
RBC # FLD: 11.4 % — SIGNIFICANT CHANGE UP (ref 10.3–14.5)
RBC CASTS # UR COMP ASSIST: 9 /HPF — HIGH (ref 0–4)
RH IG SCN BLD-IMP: POSITIVE — SIGNIFICANT CHANGE UP
SODIUM SERPL-SCNC: 135 MMOL/L — SIGNIFICANT CHANGE UP (ref 135–145)
SP GR SPEC: 1.01 — SIGNIFICANT CHANGE UP (ref 1–1.03)
SQUAMOUS # UR AUTO: 4 /HPF — SIGNIFICANT CHANGE UP (ref 0–5)
UROBILINOGEN FLD QL: 0.2 MG/DL — SIGNIFICANT CHANGE UP (ref 0.2–1)
WBC # BLD: 8.75 K/UL — SIGNIFICANT CHANGE UP (ref 3.8–10.5)
WBC # FLD AUTO: 8.75 K/UL — SIGNIFICANT CHANGE UP (ref 3.8–10.5)
WBC UR QL: 3 /HPF — SIGNIFICANT CHANGE UP (ref 0–5)

## 2024-03-12 PROCEDURE — 87086 URINE CULTURE/COLONY COUNT: CPT

## 2024-03-12 PROCEDURE — 81001 URINALYSIS AUTO W/SCOPE: CPT

## 2024-03-12 PROCEDURE — 86901 BLOOD TYPING SEROLOGIC RH(D): CPT

## 2024-03-12 PROCEDURE — 85610 PROTHROMBIN TIME: CPT

## 2024-03-12 PROCEDURE — 86850 RBC ANTIBODY SCREEN: CPT

## 2024-03-12 PROCEDURE — 84702 CHORIONIC GONADOTROPIN TEST: CPT

## 2024-03-12 PROCEDURE — 76817 TRANSVAGINAL US OBSTETRIC: CPT

## 2024-03-12 PROCEDURE — 99284 EMERGENCY DEPT VISIT MOD MDM: CPT | Mod: 25

## 2024-03-12 PROCEDURE — 87186 SC STD MICRODIL/AGAR DIL: CPT

## 2024-03-12 PROCEDURE — 80053 COMPREHEN METABOLIC PANEL: CPT

## 2024-03-12 PROCEDURE — 85730 THROMBOPLASTIN TIME PARTIAL: CPT

## 2024-03-12 PROCEDURE — 87077 CULTURE AEROBIC IDENTIFY: CPT

## 2024-03-12 PROCEDURE — 86900 BLOOD TYPING SEROLOGIC ABO: CPT

## 2024-03-12 PROCEDURE — 76817 TRANSVAGINAL US OBSTETRIC: CPT | Mod: 26

## 2024-03-12 PROCEDURE — 85025 COMPLETE CBC W/AUTO DIFF WBC: CPT

## 2024-03-12 NOTE — ED PROVIDER NOTE - DIFFERENTIAL DIAGNOSIS
Differential Diagnosis Ddx includes, however, is not limited to: threatened ab, ectopic pregnancy, other

## 2024-03-12 NOTE — ED PROVIDER NOTE - CLINICAL SUMMARY MEDICAL DECISION MAKING FREE TEXT BOX
23 year old F with hx of spontaneous  presents with pelvic pain and vaginal bleeding x 1 day. Will obtain labs, including hcg and obtain TVUS. 23 year old F with hx of spontaneous  presents with pelvic pain and vaginal bleeding x 1 day. Will obtain labs, including hcg and obtain TVUS.    LISA Schroeder MD: Agree with resident/ACP MDM, assessment and plan as above.

## 2024-03-12 NOTE — ED PROVIDER NOTE - NSFOLLOWUPINSTRUCTIONS_ED_ALL_ED_FT
Today in the emergency department you were evaluated for vaginal bleeding and pelvic pain after having a home positive pregnancy test.  Your hCG was 11,167.  We did an ultrasound that showed an intrauterine pregnancy with a small chorionic hematoma (implantation bleeding).      Please follow-up with your OB/GYN within 1 to 3 days of discharge from the emergency department.  Please bring a copy of your results with you.  Please return to the emergency department for worsening of your symptoms.    You may take Acetaminophen over the counter as needed for pain and/or fever. Use as directed and see medication warnings.      DISCHARGE INSTRUCTIONS:  Seek care immediately if:  You develop a severe headache that does not go away.  You have new or increased vision changes, such as blurred or spotted vision  You have new or increased swelling in your face or hands.  You have pain or cramping in your abdomen or low back.  You have vaginal bleeding.    Call your doctor or obstetrician if:  You have abdominal cramps, pressure, or tightening.  You have a change in vaginal discharge.  You cannot keep food or drinks down, and you are losing weight.  You have chills or a fever.  You have vaginal itching, burning, or pain.  You have yellow, green, white, or foul-smelling vaginal discharge.  You have pain or burning when you urinate, less urine than usual, or pink or bloody urine.  You have questions or concerns about your condition or care.

## 2024-03-12 NOTE — ED PROVIDER NOTE - PHYSICAL EXAMINATION
VITALS:   T(C): 37 (03-11-24 @ 23:27), Max: 37 (03-11-24 @ 23:27)  HR: 99 (03-11-24 @ 23:27) (99 - 99)  BP: 118/75 (03-11-24 @ 23:27) (118/75 - 118/75)  RR: 19 (03-11-24 @ 23:27) (19 - 19)  SpO2: 98% (03-11-24 @ 23:27) (98% - 98%)    GENERAL: NAD, lying in bed comfortably  HEAD:  Atraumatic, normocephalic  EYES: EOMI, PERRLA, conjunctiva and sclera clear  ENT: Moist mucous membranes  NECK: Supple, no JVD  HEART: Regular rate and rhythm, no murmurs, rubs, or gallops  LUNGS: Unlabored respirations.  Clear to auscultation bilaterally, no crackles, wheezing, or rhonchi  ABDOMEN: Soft, nontender, nondistended, +BS  EXTREMITIES: 2+ peripheral pulses bilaterally. No clubbing, cyanosis, or edema  NERVOUS SYSTEM:  A&Ox3, no focal deficits   SKIN: No rashes or lesions

## 2024-03-12 NOTE — ED PROVIDER NOTE - OBJECTIVE STATEMENT
23F with  history of recent spotnaneous  2023 presents for vaginal bleeding and pelvic pain in the setting of a positive home pregnancy test. Patient 23F with  history of recent spotnaneous  2023 presents for vaginal bleeding and pelvic pain in the setting of a positive home pregnancy test. Patient reports vaginal spotting after her positive result, with increasing bleeding (passage of some small clots) on 3/12, with associated pelvic pain. Patient adherent with home OCP, takes daily. Patient currently feels improved though reports pain is intermittent and comes and goes. Denies any fevers, chills, nausea, or vomiting.

## 2024-03-12 NOTE — ED PROVIDER NOTE - PROGRESS NOTE DETAILS
Radha Munoz PGY2 - sign out - 23-year-old female G2, P0 presenting with vaginal bleeding and pelvic pain after a positive pregnancy test at home.  Patient hCG 12118, TVUS with angle IUP, trace subchorionic hematoma, estimated gestational age 5 weeks 5 days.  Patient has OB/GYN at [facility] who she can follow-up with.  Discussed return precautions and following up with OB later this week.  Dispo to home.

## 2024-03-12 NOTE — ED PROVIDER NOTE - PATIENT PORTAL LINK FT
You can access the FollowMyHealth Patient Portal offered by [facility] by registering at the following website: [email or web address]. By joining [employer]’s FollowMyHealth portal, you will also be able to view your health information using other applications (apps) compatible with our system.

## 2024-03-12 NOTE — ED ADULT NURSE NOTE - OBJECTIVE STATEMENT
23F, A&Ox4,  history of recent spontaneous  2023. pt presents to the ED c/o vaginal bleeding and pelvic pain. recent positive home pregnancy test. Patient reports vaginal spotting after her positive result, with increasing bleeding (passage of some small clots) on 3/12, with associated pelvic pain. Patient currently feels improved though reports pain is intermittent and comes and goes. Denies any fevers, chills, nausea, or vomiting.

## 2024-03-12 NOTE — ED PROVIDER NOTE - NS ED ROS FT
REVIEW OF SYSTEMS:    CONSTITUTIONAL: No weakness, fevers or chills  EYES/ENT: No visual changes;  No vertigo or throat pain   NECK: No pain or stiffness  RESPIRATORY: No cough, wheezing, hemoptysis; No shortness of breath  CARDIOVASCULAR: No chest pain or palpitations  GASTROINTESTINAL: No abdominal or epigastric pain. No nausea, vomiting, or hematemesis; No diarrhea or constipation. No melena or hematochezia.  GENITOURINARY: +vaginal bleeding; +pelvic tarsha  NEUROLOGICAL: No numbness or weakness  SKIN: No itching, rashes

## 2024-03-14 LAB
-  AMOXICILLIN/CLAVULANIC ACID: SIGNIFICANT CHANGE UP
-  AMPICILLIN/SULBACTAM: SIGNIFICANT CHANGE UP
-  AMPICILLIN: SIGNIFICANT CHANGE UP
-  AZTREONAM: SIGNIFICANT CHANGE UP
-  CEFAZOLIN: SIGNIFICANT CHANGE UP
-  CEFEPIME: SIGNIFICANT CHANGE UP
-  CEFOXITIN: SIGNIFICANT CHANGE UP
-  CEFTRIAXONE: SIGNIFICANT CHANGE UP
-  CEFUROXIME: SIGNIFICANT CHANGE UP
-  CIPROFLOXACIN: SIGNIFICANT CHANGE UP
-  ERTAPENEM: SIGNIFICANT CHANGE UP
-  GENTAMICIN: SIGNIFICANT CHANGE UP
-  LEVOFLOXACIN: SIGNIFICANT CHANGE UP
-  MEROPENEM: SIGNIFICANT CHANGE UP
-  NITROFURANTOIN: SIGNIFICANT CHANGE UP
-  PIPERACILLIN/TAZOBACTAM: SIGNIFICANT CHANGE UP
-  TOBRAMYCIN: SIGNIFICANT CHANGE UP
-  TRIMETHOPRIM/SULFAMETHOXAZOLE: SIGNIFICANT CHANGE UP
CULTURE RESULTS: ABNORMAL
METHOD TYPE: SIGNIFICANT CHANGE UP
ORGANISM # SPEC MICROSCOPIC CNT: ABNORMAL
ORGANISM # SPEC MICROSCOPIC CNT: ABNORMAL
SPECIMEN SOURCE: SIGNIFICANT CHANGE UP

## 2024-03-25 ENCOUNTER — APPOINTMENT (OUTPATIENT)
Dept: OBGYN | Facility: CLINIC | Age: 24
End: 2024-03-25
Payer: COMMERCIAL

## 2024-03-25 VITALS
SYSTOLIC BLOOD PRESSURE: 106 MMHG | BODY MASS INDEX: 23.74 KG/M2 | WEIGHT: 134 LBS | DIASTOLIC BLOOD PRESSURE: 65 MMHG | HEIGHT: 63 IN

## 2024-03-25 DIAGNOSIS — Z01.84 ENCOUNTER FOR ANTIBODY RESPONSE EXAMINATION: ICD-10-CM

## 2024-03-25 DIAGNOSIS — Z13.1 ENCOUNTER FOR SCREENING FOR DIABETES MELLITUS: ICD-10-CM

## 2024-03-25 DIAGNOSIS — R30.0 DYSURIA: ICD-10-CM

## 2024-03-25 DIAGNOSIS — Z13.21 ENCOUNTER FOR SCREENING FOR NUTRITIONAL DISORDER: ICD-10-CM

## 2024-03-25 DIAGNOSIS — Z13.88 ENCOUNTER FOR SCREENING FOR DISORDER DUE TO EXPOSURE TO CONTAMINANTS: ICD-10-CM

## 2024-03-25 DIAGNOSIS — Z12.39 ENCOUNTER FOR OTHER SCREENING FOR MALIGNANT NEOPLASM OF BREAST: ICD-10-CM

## 2024-03-25 DIAGNOSIS — Z13.29 ENCOUNTER FOR SCREENING FOR OTHER SUSPECTED ENDOCRINE DISORDER: ICD-10-CM

## 2024-03-25 DIAGNOSIS — Z11.3 ENCOUNTER FOR SCREENING FOR INFECTIONS WITH A PREDOMINANTLY SEXUAL MODE OF TRANSMISSION: ICD-10-CM

## 2024-03-25 DIAGNOSIS — Z12.4 ENCOUNTER FOR SCREENING FOR MALIGNANT NEOPLASM OF CERVIX: ICD-10-CM

## 2024-03-25 DIAGNOSIS — R11.0 NAUSEA: ICD-10-CM

## 2024-03-25 DIAGNOSIS — Z13.0 ENCOUNTER FOR SCREENING FOR DISEASES OF THE BLOOD AND BLOOD-FORMING ORGANS AND CERTAIN DISORDERS INVOLVING THE IMMUNE MECHANISM: ICD-10-CM

## 2024-03-25 PROCEDURE — 99214 OFFICE O/P EST MOD 30 MIN: CPT | Mod: 25

## 2024-03-25 PROCEDURE — 36415 COLL VENOUS BLD VENIPUNCTURE: CPT

## 2024-03-25 PROCEDURE — 76817 TRANSVAGINAL US OBSTETRIC: CPT

## 2024-03-25 RX ORDER — DOXYLAMINE SUCCINATE AND PYRIDOXINE HYDROCHLORIDE 10; 10 MG/1; MG/1
10-10 TABLET, DELAYED RELEASE ORAL
Qty: 60 | Refills: 2 | Status: ACTIVE | COMMUNITY
Start: 2024-03-25 | End: 1900-01-01

## 2024-03-25 RX ORDER — VITAMIN A, ASCORBIC ACID, VITAMIN D, .ALPHA.-TOCOPHEROL, THIAMINE MONONITRATE, RIBOFLAVIN, NIACIN, PYRIDOXINE HYDROCHLORIDE, FOLIC ACID, CYANOCOBALAMIN, CALCIUM, IRON, MAGNESIUM, ZINC, AND COPPER 2700; 70; 400; 30; 1.6; 1.8; 18; 2.5; 1; 12; 100; 65; 25; 25; 2 [IU]/1; MG/1; [IU]/1; [IU]/1; MG/1; MG/1; MG/1; MG/1; MG/1; UG/1; MG/1; MG/1; MG/1; MG/1; MG/1
TABLET ORAL DAILY
Qty: 90 | Refills: 2 | Status: ACTIVE | COMMUNITY
Start: 2024-03-25 | End: 1900-01-01

## 2024-03-25 NOTE — REASON FOR VISIT
[Follow-Up] : a follow-up evaluation of [Amenorrhea] : amenorrhea [Acute] : an acute visit for [Spouse] : spouse
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
## 2024-03-25 NOTE — REVIEW OF SYSTEMS
[Vomiting] : vomiting [Patient Intake Form Reviewed] : Patient intake form was reviewed [Nausea] : nausea [Negative] : Heme/Lymph

## 2024-03-25 NOTE — COUNSELING
[Nutrition/ Exercise/ Weight Management] : nutrition, exercise, weight management [Vitamins/Supplements] : vitamins/supplements [Breast Self Exam] : breast self exam [Contraception/ Emergency Contraception/ Safe Sexual Practices] : contraception, emergency contraception, safe sexual practices [Pregnancy Options] : pregnancy options [Preconception Care/ Fertility options] : preconception care, fertility options [Confidentiality] : confidentiality [STD (testing, results, tx)] : STD (testing, results, tx) [Lab Results] : lab results [Medication Management] : medication management

## 2024-03-25 NOTE — PROCEDURE
[Cervical Pap Smear] : cervical Pap smear [Liquid Base] : liquid base [Tolerated Well] : the patient tolerated the procedure well [GC Chlamydia Culture] : GC Chlamydia Culture [No Complications] : there were no complications [Transvaginal OB Sonogram] : Transvaginal OB Sonogram [Intrauterine Pregnancy] : intrauterine pregnancy [Fetal Heart] : fetal heart present [Yolk Sac] : yolk sac present [Current GA by Sonogram: ___ (wks)] : Current GA by Sonogram: [unfilled]Uwks [Date: ___] : Date: [unfilled] [___ day(s)] : [unfilled] days [Transvaginal OB Sonogram WNL] : Transvaginal OB Sonogram WNL [FreeTextEntry1] : CRL 7w5d, LMP 7w6d. Maintain ALISSA by lmp 11/5/2024.

## 2024-03-25 NOTE — PHYSICAL EXAM
[Chaperone Present] : A chaperone was present in the examining room during all aspects of the physical examination [FreeTextEntry1] : FOB present [Alert] : alert [Appropriately responsive] : appropriately responsive [No Lymphadenopathy] : no lymphadenopathy [No Acute Distress] : no acute distress [Soft] : soft [Non-tender] : non-tender [No HSM] : No HSM [Non-distended] : non-distended [No Lesions] : no lesions [No Mass] : no mass [Oriented x3] : oriented x3 [Examination Of The Breasts] : a normal appearance [No Masses] : no breast masses were palpable [Labia Minora] : normal [Labia Majora] : normal [No Bleeding] : There was no active vaginal bleeding [Normal] : normal [Uterine Adnexae] : normal [Enlarged ___ wks] : enlarged [unfilled] ~Uweeks

## 2024-03-25 NOTE — PLAN
[FreeTextEntry1] : SECONDARY AMENORRHEA/EARLY IUP -TVUS today shows early IUP measuring CRL 7w5d, LMP 7w6d. Maintain ALISSA by lmp 11/5/2024.   - Early pregnancy precautions, practice /hospital info and folder provided to patient.  -NOB blood panel, GCC cx, UCX, pap collected and sent at todays office visit.  -Start PNVs  GENETICS -WHITNEY expanded carrier screen drawn and sent at todays visit   Nausea -erx sent for Diclegis  rto 4 weeks for NPN/NIPS/NT sono

## 2024-03-26 ENCOUNTER — NON-APPOINTMENT (OUTPATIENT)
Age: 24
End: 2024-03-26

## 2024-03-26 LAB
25(OH)D3 SERPL-MCNC: 19.5 NG/ML
ABO + RH PNL BLD: NORMAL
BASOPHILS # BLD AUTO: 0.05 K/UL
BASOPHILS NFR BLD AUTO: 0.3 %
BLD GP AB SCN SERPL QL: NORMAL
C TRACH RRNA SPEC QL NAA+PROBE: NOT DETECTED
EOSINOPHIL # BLD AUTO: 0.2 K/UL
EOSINOPHIL NFR BLD AUTO: 1.4 %
ESTIMATED AVERAGE GLUCOSE: 91 MG/DL
HBA1C MFR BLD HPLC: 4.8 %
HBV SURFACE AG SER QL: NONREACTIVE
HCT VFR BLD CALC: 34.9 %
HCV AB SER QL: NONREACTIVE
HCV S/CO RATIO: 0.09 S/CO
HGB A MFR BLD: 97.2 %
HGB A2 MFR BLD: 2.8 %
HGB BLD-MCNC: 12.7 G/DL
HGB FRACT BLD-IMP: NORMAL
HIV1+2 AB SPEC QL IA.RAPID: NONREACTIVE
IMM GRANULOCYTES NFR BLD AUTO: 0.4 %
LEAD BLD-MCNC: <1 UG/DL
LYMPHOCYTES # BLD AUTO: 2.87 K/UL
LYMPHOCYTES NFR BLD AUTO: 19.9 %
MAN DIFF?: NORMAL
MCHC RBC-ENTMCNC: 30.8 PG
MCHC RBC-ENTMCNC: 36.4 GM/DL
MCV RBC AUTO: 84.5 FL
MONOCYTES # BLD AUTO: 0.64 K/UL
MONOCYTES NFR BLD AUTO: 4.4 %
N GONORRHOEA RRNA SPEC QL NAA+PROBE: NOT DETECTED
NEUTROPHILS # BLD AUTO: 10.6 K/UL
NEUTROPHILS NFR BLD AUTO: 73.6 %
PLATELET # BLD AUTO: 244 K/UL
RBC # BLD: 4.13 M/UL
RBC # FLD: 11.6 %
SOURCE AMPLIFICATION: NORMAL
T PALLIDUM AB SER QL IA: NEGATIVE
TSH SERPL-ACNC: 1.08 UIU/ML
WBC # FLD AUTO: 14.42 K/UL

## 2024-03-26 RX ORDER — PRENATAL VIT,CAL 73/IRON/FOLIC 28 MG-1 MG
TABLET ORAL DAILY
Qty: 90 | Refills: 2 | Status: ACTIVE | COMMUNITY
Start: 2024-03-26 | End: 1900-01-01

## 2024-03-27 LAB
BACTERIA UR CULT: NORMAL
MEV IGG FLD QL IA: 17.3 AU/ML
MEV IGG+IGM SER-IMP: POSITIVE
MUV AB SER-ACNC: POSITIVE
MUV IGG SER QL IA: 84.6 AU/ML
RUBV IGG FLD-ACNC: 6.1 INDEX
RUBV IGG SER-IMP: POSITIVE
VZV AB TITR SER: NEGATIVE
VZV IGG SER IF-ACNC: 121.6 INDEX

## 2024-03-28 LAB
B19V IGG SER QL IA: 5.46 INDEX
B19V IGG+IGM SER-IMP: NORMAL
B19V IGG+IGM SER-IMP: POSITIVE
B19V IGM FLD-ACNC: 0.2 INDEX
B19V IGM SER-ACNC: NEGATIVE
CYTOLOGY CVX/VAG DOC THIN PREP: NORMAL

## 2024-04-01 ENCOUNTER — NON-APPOINTMENT (OUTPATIENT)
Age: 24
End: 2024-04-01

## 2024-04-10 ENCOUNTER — NON-APPOINTMENT (OUTPATIENT)
Age: 24
End: 2024-04-10

## 2024-04-15 ENCOUNTER — TRANSCRIPTION ENCOUNTER (OUTPATIENT)
Age: 24
End: 2024-04-15

## 2024-04-17 ENCOUNTER — TRANSCRIPTION ENCOUNTER (OUTPATIENT)
Age: 24
End: 2024-04-17

## 2024-04-18 ENCOUNTER — TRANSCRIPTION ENCOUNTER (OUTPATIENT)
Age: 24
End: 2024-04-18

## 2024-04-24 ENCOUNTER — APPOINTMENT (OUTPATIENT)
Dept: OBGYN | Facility: CLINIC | Age: 24
End: 2024-04-24
Payer: COMMERCIAL

## 2024-04-24 ENCOUNTER — ASOB RESULT (OUTPATIENT)
Age: 24
End: 2024-04-24

## 2024-04-24 PROCEDURE — 76813 OB US NUCHAL MEAS 1 GEST: CPT

## 2024-04-24 PROCEDURE — 0500F INITIAL PRENATAL CARE VISIT: CPT

## 2024-04-24 PROCEDURE — 36415 COLL VENOUS BLD VENIPUNCTURE: CPT

## 2024-05-03 ENCOUNTER — NON-APPOINTMENT (OUTPATIENT)
Age: 24
End: 2024-05-03

## 2024-05-03 ENCOUNTER — TRANSCRIPTION ENCOUNTER (OUTPATIENT)
Age: 24
End: 2024-05-03

## 2024-05-06 ENCOUNTER — TRANSCRIPTION ENCOUNTER (OUTPATIENT)
Age: 24
End: 2024-05-06

## 2024-05-21 ENCOUNTER — APPOINTMENT (OUTPATIENT)
Dept: OBGYN | Facility: CLINIC | Age: 24
End: 2024-05-21
Payer: COMMERCIAL

## 2024-05-21 DIAGNOSIS — Z34.92 ENCOUNTER FOR SUPERVISION OF NORMAL PREGNANCY, UNSPECIFIED, SECOND TRIMESTER: ICD-10-CM

## 2024-05-21 PROCEDURE — 0502F SUBSEQUENT PRENATAL CARE: CPT

## 2024-05-21 PROCEDURE — 36415 COLL VENOUS BLD VENIPUNCTURE: CPT

## 2024-05-30 LAB
AFP MOM: 1.07
AFP VALUE: 37.4 NG/ML
ALPHA FETOPROTEIN SERUM COMMENT: NORMAL
ALPHA FETOPROTEIN SERUM INTERPRETATION: NORMAL
ALPHA FETOPROTEIN SERUM RESULTS: NORMAL
ALPHA FETOPROTEIN SERUM TEST RESULTS: NORMAL
GESTATIONAL AGE BASED ON: NORMAL
GESTATIONAL AGE ON COLLECTION DATE: 16 WEEKS
INSULIN DEP DIABETES: NORMAL
MATERNAL AGE AT EDD AFP: 23.3 YR
MULTIPLE GESTATION: NO
OSBR RISK 1 IN: 9862
RACE: NORMAL
WEIGHT AFP: 142 LBS

## 2024-05-30 NOTE — ED PROVIDER NOTE - NSICDXPASTSURGICALHX_GEN_ALL_CORE_FT
Take medication as prescribed. Follow-up with your primary care physician within 2 days for reassessment. Bring the results from this visit with you for their review. Return to the ED immediately for any new, worsening, or persistent symptoms, including fever, increased swelling, or any other medical concerns. <--- Click to Launch ICDx for PreOp, PostOp and Procedure PAST SURGICAL HISTORY:  No significant past surgical history

## 2024-06-06 ENCOUNTER — NON-APPOINTMENT (OUTPATIENT)
Age: 24
End: 2024-06-06

## 2024-06-14 ENCOUNTER — TRANSCRIPTION ENCOUNTER (OUTPATIENT)
Age: 24
End: 2024-06-14

## 2024-06-20 ENCOUNTER — TRANSCRIPTION ENCOUNTER (OUTPATIENT)
Age: 24
End: 2024-06-20

## 2024-06-24 ENCOUNTER — APPOINTMENT (OUTPATIENT)
Dept: OBGYN | Facility: CLINIC | Age: 24
End: 2024-06-24
Payer: COMMERCIAL

## 2024-06-24 PROCEDURE — 0502F SUBSEQUENT PRENATAL CARE: CPT

## 2024-06-26 ENCOUNTER — APPOINTMENT (OUTPATIENT)
Dept: ANTEPARTUM | Facility: CLINIC | Age: 24
End: 2024-06-26
Payer: COMMERCIAL

## 2024-06-26 ENCOUNTER — ASOB RESULT (OUTPATIENT)
Age: 24
End: 2024-06-26

## 2024-06-26 PROCEDURE — 76805 OB US >/= 14 WKS SNGL FETUS: CPT

## 2024-08-01 ENCOUNTER — NON-APPOINTMENT (OUTPATIENT)
Age: 24
End: 2024-08-01

## 2024-08-01 ENCOUNTER — APPOINTMENT (OUTPATIENT)
Dept: OBGYN | Facility: CLINIC | Age: 24
End: 2024-08-01
Payer: COMMERCIAL

## 2024-08-01 DIAGNOSIS — Z34.03 ENCOUNTER FOR SUPERVISION OF NORMAL FIRST PREGNANCY, THIRD TRIMESTER: ICD-10-CM

## 2024-08-01 DIAGNOSIS — Z34.82 ENCOUNTER FOR SUPERVISION OF OTHER NORMAL PREGNANCY, SECOND TRIMESTER: ICD-10-CM

## 2024-08-01 PROCEDURE — 36415 COLL VENOUS BLD VENIPUNCTURE: CPT

## 2024-08-01 PROCEDURE — 0502F SUBSEQUENT PRENATAL CARE: CPT

## 2024-08-02 LAB
25(OH)D3 SERPL-MCNC: 28.3 NG/ML
BASOPHILS # BLD AUTO: 0.04 K/UL
BASOPHILS NFR BLD AUTO: 0.3 %
BLD GP AB SCN SERPL QL: NORMAL
EOSINOPHIL # BLD AUTO: 0.18 K/UL
EOSINOPHIL NFR BLD AUTO: 1.3 %
FERRITIN SERPL-MCNC: 20 NG/ML
GLUCOSE 1H P 50 G GLC PO SERPL-MCNC: 91 MG/DL
HCT VFR BLD CALC: 30.5 %
HGB BLD-MCNC: 10.6 G/DL
HIV1+2 AB SPEC QL IA.RAPID: NONREACTIVE
IMM GRANULOCYTES NFR BLD AUTO: 0.7 %
LYMPHOCYTES # BLD AUTO: 2.14 K/UL
LYMPHOCYTES NFR BLD AUTO: 15.7 %
MAN DIFF?: NORMAL
MCHC RBC-ENTMCNC: 31.5 PG
MCHC RBC-ENTMCNC: 34.8 GM/DL
MCV RBC AUTO: 90.5 FL
MONOCYTES # BLD AUTO: 0.72 K/UL
MONOCYTES NFR BLD AUTO: 5.3 %
NEUTROPHILS # BLD AUTO: 10.47 K/UL
NEUTROPHILS NFR BLD AUTO: 76.7 %
PLATELET # BLD AUTO: 200 K/UL
RBC # BLD: 3.37 M/UL
RBC # FLD: 12.4 %
T PALLIDUM AB SER QL IA: NEGATIVE
WBC # FLD AUTO: 13.65 K/UL

## 2024-08-14 ENCOUNTER — TRANSCRIPTION ENCOUNTER (OUTPATIENT)
Age: 24
End: 2024-08-14

## 2024-08-29 ENCOUNTER — TRANSCRIPTION ENCOUNTER (OUTPATIENT)
Age: 24
End: 2024-08-29

## 2024-09-03 ENCOUNTER — APPOINTMENT (OUTPATIENT)
Dept: OBGYN | Facility: CLINIC | Age: 24
End: 2024-09-03
Payer: COMMERCIAL

## 2024-09-03 PROCEDURE — 0502F SUBSEQUENT PRENATAL CARE: CPT

## 2024-09-09 ENCOUNTER — NON-APPOINTMENT (OUTPATIENT)
Age: 24
End: 2024-09-09

## 2024-09-10 ENCOUNTER — APPOINTMENT (OUTPATIENT)
Dept: OBGYN | Facility: CLINIC | Age: 24
End: 2024-09-10
Payer: COMMERCIAL

## 2024-09-10 ENCOUNTER — ASOB RESULT (OUTPATIENT)
Age: 24
End: 2024-09-10

## 2024-09-10 DIAGNOSIS — Z23 ENCOUNTER FOR IMMUNIZATION: ICD-10-CM

## 2024-09-10 PROCEDURE — 90471 IMMUNIZATION ADMIN: CPT

## 2024-09-10 PROCEDURE — 90715 TDAP VACCINE 7 YRS/> IM: CPT

## 2024-09-10 PROCEDURE — 76816 OB US FOLLOW-UP PER FETUS: CPT

## 2024-09-10 PROCEDURE — 76819 FETAL BIOPHYS PROFIL W/O NST: CPT | Mod: 59

## 2024-09-10 PROCEDURE — 0502F SUBSEQUENT PRENATAL CARE: CPT

## 2024-09-16 ENCOUNTER — TRANSCRIPTION ENCOUNTER (OUTPATIENT)
Age: 24
End: 2024-09-16

## 2024-09-16 ENCOUNTER — NON-APPOINTMENT (OUTPATIENT)
Age: 24
End: 2024-09-16

## 2024-09-23 ENCOUNTER — NON-APPOINTMENT (OUTPATIENT)
Age: 24
End: 2024-09-23

## 2024-09-23 ENCOUNTER — APPOINTMENT (OUTPATIENT)
Dept: OBGYN | Facility: CLINIC | Age: 24
End: 2024-09-23
Payer: COMMERCIAL

## 2024-09-23 DIAGNOSIS — D64.9 ANEMIA, UNSPECIFIED: ICD-10-CM

## 2024-09-23 LAB
GLUCOSE QUALITATIVE U: NEGATIVE
PROTEIN URINE: NEGATIVE

## 2024-09-23 PROCEDURE — 0502F SUBSEQUENT PRENATAL CARE: CPT

## 2024-09-24 LAB
BASOPHILS # BLD AUTO: 0.04 K/UL
BASOPHILS NFR BLD AUTO: 0.3 %
EOSINOPHIL # BLD AUTO: 0.25 K/UL
EOSINOPHIL NFR BLD AUTO: 1.7 %
HCT VFR BLD CALC: 33.3 %
HGB BLD-MCNC: 11.5 G/DL
IMM GRANULOCYTES NFR BLD AUTO: 1.1 %
LYMPHOCYTES # BLD AUTO: 1.8 K/UL
LYMPHOCYTES NFR BLD AUTO: 12 %
MAN DIFF?: NORMAL
MCHC RBC-ENTMCNC: 32.6 PG
MCHC RBC-ENTMCNC: 34.5 GM/DL
MCV RBC AUTO: 94.3 FL
MONOCYTES # BLD AUTO: 0.87 K/UL
MONOCYTES NFR BLD AUTO: 5.8 %
NEUTROPHILS # BLD AUTO: 11.88 K/UL
NEUTROPHILS NFR BLD AUTO: 79.1 %
PLATELET # BLD AUTO: 191 K/UL
RBC # BLD: 3.53 M/UL
RBC # FLD: 13.1 %
WBC # FLD AUTO: 15 K/UL

## 2024-10-01 ENCOUNTER — TRANSCRIPTION ENCOUNTER (OUTPATIENT)
Age: 24
End: 2024-10-01

## 2024-10-01 ENCOUNTER — EMERGENCY (EMERGENCY)
Facility: HOSPITAL | Age: 24
LOS: 1 days | Discharge: OTHER INSTITUTION | End: 2024-10-01
Attending: EMERGENCY MEDICINE
Payer: COMMERCIAL

## 2024-10-01 ENCOUNTER — OUTPATIENT (OUTPATIENT)
Dept: OUTPATIENT SERVICES | Facility: HOSPITAL | Age: 24
LOS: 1 days | End: 2024-10-01
Payer: COMMERCIAL

## 2024-10-01 VITALS
RESPIRATION RATE: 20 BRPM | TEMPERATURE: 98 F | OXYGEN SATURATION: 96 % | DIASTOLIC BLOOD PRESSURE: 76 MMHG | HEIGHT: 63 IN | SYSTOLIC BLOOD PRESSURE: 127 MMHG | WEIGHT: 169.09 LBS | HEART RATE: 82 BPM

## 2024-10-01 VITALS
RESPIRATION RATE: 18 BRPM | DIASTOLIC BLOOD PRESSURE: 72 MMHG | OXYGEN SATURATION: 99 % | TEMPERATURE: 98 F | SYSTOLIC BLOOD PRESSURE: 109 MMHG | HEART RATE: 96 BPM

## 2024-10-01 VITALS — SYSTOLIC BLOOD PRESSURE: 116 MMHG | HEART RATE: 81 BPM | DIASTOLIC BLOOD PRESSURE: 57 MMHG

## 2024-10-01 VITALS — TEMPERATURE: 98 F

## 2024-10-01 DIAGNOSIS — O26.899 OTHER SPECIFIED PREGNANCY RELATED CONDITIONS, UNSPECIFIED TRIMESTER: ICD-10-CM

## 2024-10-01 LAB
ALBUMIN SERPL ELPH-MCNC: 3.3 G/DL — SIGNIFICANT CHANGE UP (ref 3.3–5)
ALP SERPL-CCNC: 112 U/L — SIGNIFICANT CHANGE UP (ref 40–120)
ALT FLD-CCNC: 12 U/L — SIGNIFICANT CHANGE UP (ref 10–45)
ANION GAP SERPL CALC-SCNC: 10 MMOL/L — SIGNIFICANT CHANGE UP (ref 5–17)
APPEARANCE UR: ABNORMAL
AST SERPL-CCNC: 13 U/L — SIGNIFICANT CHANGE UP (ref 10–40)
BACTERIA # UR AUTO: ABNORMAL /HPF
BASOPHILS # BLD AUTO: 0.04 K/UL — SIGNIFICANT CHANGE UP (ref 0–0.2)
BASOPHILS NFR BLD AUTO: 0.3 % — SIGNIFICANT CHANGE UP (ref 0–2)
BILIRUB SERPL-MCNC: 0.4 MG/DL — SIGNIFICANT CHANGE UP (ref 0.2–1.2)
BILIRUB UR-MCNC: NEGATIVE — SIGNIFICANT CHANGE UP
BUN SERPL-MCNC: 10 MG/DL — SIGNIFICANT CHANGE UP (ref 7–23)
CALCIUM SERPL-MCNC: 8.4 MG/DL — SIGNIFICANT CHANGE UP (ref 8.4–10.5)
CAST: 7 /LPF — HIGH (ref 0–4)
CHLORIDE SERPL-SCNC: 105 MMOL/L — SIGNIFICANT CHANGE UP (ref 96–108)
CO2 SERPL-SCNC: 20 MMOL/L — LOW (ref 22–31)
COLOR SPEC: YELLOW — SIGNIFICANT CHANGE UP
CREAT ?TM UR-MCNC: 228 MG/DL — SIGNIFICANT CHANGE UP
CREAT SERPL-MCNC: 0.51 MG/DL — SIGNIFICANT CHANGE UP (ref 0.5–1.3)
DIFF PNL FLD: NEGATIVE — SIGNIFICANT CHANGE UP
EGFR: 134 ML/MIN/1.73M2 — SIGNIFICANT CHANGE UP
EOSINOPHIL # BLD AUTO: 0.2 K/UL — SIGNIFICANT CHANGE UP (ref 0–0.5)
EOSINOPHIL NFR BLD AUTO: 1.7 % — SIGNIFICANT CHANGE UP (ref 0–6)
GLUCOSE SERPL-MCNC: 84 MG/DL — SIGNIFICANT CHANGE UP (ref 70–99)
GLUCOSE UR QL: NEGATIVE MG/DL — SIGNIFICANT CHANGE UP
HCT VFR BLD CALC: 29.2 % — LOW (ref 34.5–45)
HGB BLD-MCNC: 9.9 G/DL — LOW (ref 11.5–15.5)
IMM GRANULOCYTES NFR BLD AUTO: 1.3 % — HIGH (ref 0–0.9)
KETONES UR-MCNC: NEGATIVE MG/DL — SIGNIFICANT CHANGE UP
LDH SERPL L TO P-CCNC: 162 U/L — SIGNIFICANT CHANGE UP (ref 50–242)
LEUKOCYTE ESTERASE UR-ACNC: ABNORMAL
LYMPHOCYTES # BLD AUTO: 2.49 K/UL — SIGNIFICANT CHANGE UP (ref 1–3.3)
LYMPHOCYTES # BLD AUTO: 20.8 % — SIGNIFICANT CHANGE UP (ref 13–44)
MCHC RBC-ENTMCNC: 31 PG — SIGNIFICANT CHANGE UP (ref 27–34)
MCHC RBC-ENTMCNC: 33.9 GM/DL — SIGNIFICANT CHANGE UP (ref 32–36)
MCV RBC AUTO: 91.5 FL — SIGNIFICANT CHANGE UP (ref 80–100)
MONOCYTES # BLD AUTO: 0.82 K/UL — SIGNIFICANT CHANGE UP (ref 0–0.9)
MONOCYTES NFR BLD AUTO: 6.9 % — SIGNIFICANT CHANGE UP (ref 2–14)
NEUTROPHILS # BLD AUTO: 8.24 K/UL — HIGH (ref 1.8–7.4)
NEUTROPHILS NFR BLD AUTO: 69 % — SIGNIFICANT CHANGE UP (ref 43–77)
NITRITE UR-MCNC: NEGATIVE — SIGNIFICANT CHANGE UP
NRBC # BLD: 0 /100 WBCS — SIGNIFICANT CHANGE UP (ref 0–0)
PH UR: 6.5 — SIGNIFICANT CHANGE UP (ref 5–8)
PLATELET # BLD AUTO: 187 K/UL — SIGNIFICANT CHANGE UP (ref 150–400)
POTASSIUM SERPL-MCNC: 3.8 MMOL/L — SIGNIFICANT CHANGE UP (ref 3.5–5.3)
POTASSIUM SERPL-SCNC: 3.8 MMOL/L — SIGNIFICANT CHANGE UP (ref 3.5–5.3)
PROT ?TM UR-MCNC: 20 MG/DL — HIGH (ref 0–12)
PROT SERPL-MCNC: 6.1 G/DL — SIGNIFICANT CHANGE UP (ref 6–8.3)
PROT UR-MCNC: SIGNIFICANT CHANGE UP MG/DL
PROT/CREAT UR-RTO: 0.1 RATIO — SIGNIFICANT CHANGE UP (ref 0–0.2)
RBC # BLD: 3.19 M/UL — LOW (ref 3.8–5.2)
RBC # FLD: 12.3 % — SIGNIFICANT CHANGE UP (ref 10.3–14.5)
RBC CASTS # UR COMP ASSIST: 6 /HPF — HIGH (ref 0–4)
REVIEW: SIGNIFICANT CHANGE UP
SODIUM SERPL-SCNC: 135 MMOL/L — SIGNIFICANT CHANGE UP (ref 135–145)
SP GR SPEC: 1.03 — SIGNIFICANT CHANGE UP (ref 1–1.03)
SQUAMOUS # UR AUTO: 11 /HPF — HIGH (ref 0–5)
URATE SERPL-MCNC: 3.8 MG/DL — SIGNIFICANT CHANGE UP (ref 2.5–7)
UROBILINOGEN FLD QL: 1 MG/DL — SIGNIFICANT CHANGE UP (ref 0.2–1)
WBC # BLD: 11.95 K/UL — HIGH (ref 3.8–10.5)
WBC # FLD AUTO: 11.95 K/UL — HIGH (ref 3.8–10.5)
WBC UR QL: 70 /HPF — HIGH (ref 0–5)

## 2024-10-01 PROCEDURE — 82570 ASSAY OF URINE CREATININE: CPT

## 2024-10-01 PROCEDURE — 84156 ASSAY OF PROTEIN URINE: CPT

## 2024-10-01 PROCEDURE — 99284 EMERGENCY DEPT VISIT MOD MDM: CPT

## 2024-10-01 PROCEDURE — 84550 ASSAY OF BLOOD/URIC ACID: CPT

## 2024-10-01 PROCEDURE — 83615 LACTATE (LD) (LDH) ENZYME: CPT

## 2024-10-01 PROCEDURE — 80053 COMPREHEN METABOLIC PANEL: CPT

## 2024-10-01 PROCEDURE — 93971 EXTREMITY STUDY: CPT | Mod: 26,LT

## 2024-10-01 PROCEDURE — 93971 EXTREMITY STUDY: CPT

## 2024-10-01 PROCEDURE — 99284 EMERGENCY DEPT VISIT MOD MDM: CPT | Mod: 25

## 2024-10-01 PROCEDURE — 81001 URINALYSIS AUTO W/SCOPE: CPT

## 2024-10-01 PROCEDURE — 85025 COMPLETE CBC W/AUTO DIFF WBC: CPT

## 2024-10-01 PROCEDURE — 87086 URINE CULTURE/COLONY COUNT: CPT

## 2024-10-01 PROCEDURE — G0463: CPT

## 2024-10-01 NOTE — OB PROVIDER TRIAGE NOTE - NSOBPROVIDERNOTE_OBGYN_ALL_OB_FT
Assessment  – 24yoF  gestational age presents 35w1d presents from ED for L ankle swelling. Dopplers from ED neg. No labor sxs and denies PEC sxs.  +FM. -LOF. -CTXs. -VB. Pt denies any other concerns.    Plan  -NST reactive.  -BP normotensive  -HELLP labs pending  -Labor return precautions given.     Patient discussed with attending physician, Dr. Junior .    Efra León MD PGY3 Assessment  – 24yoF  gestational age presents 35w1d presents from ED for L ankle swelling. Dopplers from ED neg. No labor sxs and denies PEC sxs.  +FM. -LOF. -CTXs. -VB. Pt denies any other concerns.    Plan  -NST reactive.  -BP normotensive  -HELLP labs pending  -Labor return precautions given.   -LE doppler neg, no leg pain on PE      Addendum  HELLP wnl, P/C 0.1 no labor concerns no signs of PEC, plan to send pt home with return precautions. Ucx sent for dirty UA pt without urinary sxs.     Efra León MD PGY3  Patient discussed with attending physician, Dr. Junior .

## 2024-10-01 NOTE — ED ADULT NURSE NOTE - OBJECTIVE STATEMENT
23 y/o female presents to the ED endorsing L-leg swelling associated with pain upon ambulation x3 weeks. A/Ox4. Ambulatory without assistive devices at baseline. Patient is  and currently 35 weeks pregnant. Patient endorses the pain in the L leg developed today. Patient endorses follow-up with OB/GYN. Patient was referred to the ED for evaluation by OB/GYN. Patient denies chest pain, dyspnea, fever, cough and chills. Safety and comfort provided.

## 2024-10-01 NOTE — OB PROVIDER TRIAGE NOTE - HISTORY OF PRESENT ILLNESS
Admission H&P    Subjective  HPI: 24yoF  gestational age presents 35w1d presents from ED for L ankle swelling. Dopplers from ED neg. No labor sxs and denies PEC sxs.  +FM. -LOF. -CTXs. -VB. Pt denies any other concerns.    – PNC: Denies prenatal issues.   – OBHx: Missed AB s/p D&C  – GynHx: hx of ovarian cyst.   –Allergies: NKDA  – Meds: pnv  – PMH: D&C x1, breast biopsy (benign per pt)  – PSH: denies  – Psych/Social: denies

## 2024-10-01 NOTE — ED PROVIDER NOTE - ATTENDING APP SHARED VISIT CONTRIBUTION OF CARE
Attending MD Ashraf:   I personally have seen and examined this patient.  Physician assistant note reviewed and agree on plan of care and except where noted.  See below for details.     seen in Blue 31L, accompanied by     24F with no reported contributory PMH/PSH/Meds, no known drug allergies presents to the ED with L foot and ankle swelling.  Reports is 35 weeks pregnant and noted that L foot has been intermittently swollen for last 2-3 weeks, improves with elevation.  Reports presents today because of pain on L foot.  Denies redness, fevers, chills. Denies calf pain.  Denies chest pain, shortness of breath, abdominal pain, nausea, vomiting, diarrhea, urinary complaints. Denies vaginal bleeding, fluid.    Exam:   General: NAD  HENT: head NCAT, airway patent  Eyes: anicteric, no conjunctival injection   Lungs: lungs CTAB with good inspiratory effort, no wheezing, no rhonchi, no rales  Cardiac: +S1S2, no obvious m/r/g  GI: abdomen soft with +BS, NT, ND  MSK: ranging neck and extremities freely, no calf tenderness, swelling, erythema or warmth, +mild L foot swelling, minimal tenderness, no erythema, no increased warmth, +2 DPs, cap refill <2s  Neuro: moving all extremities spontaneously, nonfocal  Psych: normal mood and affect     A/P: 24F with L foot swelling, suspect may be secondary to 35 wks pregnant, will obtain US to eval for DVT, if US nonactionable, will send to L&D

## 2024-10-01 NOTE — OB PROVIDER TRIAGE NOTE - NSHPPHYSICALEXAM_GEN_ALL_CORE
Objective  – PE:   CV: RRR  Pulm: breathing comfortably on RA  Abd: gravid, nontender  Extr: moving all extremities with ease Objective  – PE:   CV: RRR  Pulm: breathing comfortably on RA  Abd: gravid, nontender  Extr: moving all extremities with ease, BL LE 2+ swelling with no BL calf pain.

## 2024-10-01 NOTE — ED ADULT TRIAGE NOTE - CHIEF COMPLAINT QUOTE
c/o  L foot swelling x 2 weeks, worsening pain today. denies injury. no vision changes, headache. 35 weeks pregnant.   Per Symone RN - pt to be seen in ED prior to L&D assessment

## 2024-10-03 LAB
CULTURE RESULTS: SIGNIFICANT CHANGE UP
SPECIMEN SOURCE: SIGNIFICANT CHANGE UP

## 2024-10-07 DIAGNOSIS — Z3A.35 35 WEEKS GESTATION OF PREGNANCY: ICD-10-CM

## 2024-10-07 DIAGNOSIS — N83.209 UNSPECIFIED OVARIAN CYST, UNSPECIFIED SIDE: ICD-10-CM

## 2024-10-07 DIAGNOSIS — O34.83 MATERNAL CARE FOR OTHER ABNORMALITIES OF PELVIC ORGANS, THIRD TRIMESTER: ICD-10-CM

## 2024-10-07 DIAGNOSIS — M79.89 OTHER SPECIFIED SOFT TISSUE DISORDERS: ICD-10-CM

## 2024-10-07 DIAGNOSIS — O99.891 OTHER SPECIFIED DISEASES AND CONDITIONS COMPLICATING PREGNANCY: ICD-10-CM

## 2024-10-07 DIAGNOSIS — O09.293 SUPERVISION OF PREGNANCY WITH OTHER POOR REPRODUCTIVE OR OBSTETRIC HISTORY, THIRD TRIMESTER: ICD-10-CM

## 2024-10-08 ENCOUNTER — APPOINTMENT (OUTPATIENT)
Dept: OBGYN | Facility: CLINIC | Age: 24
End: 2024-10-08
Payer: COMMERCIAL

## 2024-10-08 DIAGNOSIS — Z23 ENCOUNTER FOR IMMUNIZATION: ICD-10-CM

## 2024-10-08 PROCEDURE — 90471 IMMUNIZATION ADMIN: CPT

## 2024-10-08 PROCEDURE — 0502F SUBSEQUENT PRENATAL CARE: CPT

## 2024-10-08 PROCEDURE — 90678 RSV VACC PREF BIVALENT IM: CPT

## 2024-10-12 LAB
GP B STREP DNA SPEC QL NAA+PROBE: NOT DETECTED
SOURCE GBS: NORMAL

## 2024-10-15 ENCOUNTER — NON-APPOINTMENT (OUTPATIENT)
Age: 24
End: 2024-10-15

## 2024-10-15 ENCOUNTER — APPOINTMENT (OUTPATIENT)
Dept: OBGYN | Facility: CLINIC | Age: 24
End: 2024-10-15
Payer: COMMERCIAL

## 2024-10-15 DIAGNOSIS — Z34.83 ENCOUNTER FOR SUPERVISION OF OTHER NORMAL PREGNANCY, THIRD TRIMESTER: ICD-10-CM

## 2024-10-15 PROBLEM — Z34.93 THIRD TRIMESTER PREGNANCY: Status: ACTIVE | Noted: 2024-10-15

## 2024-10-15 LAB
GLUCOSE QUALITATIVE U: NEGATIVE
PROTEIN URINE: NEGATIVE

## 2024-10-15 PROCEDURE — 0502F SUBSEQUENT PRENATAL CARE: CPT

## 2024-10-22 ENCOUNTER — NON-APPOINTMENT (OUTPATIENT)
Age: 24
End: 2024-10-22

## 2024-10-22 ENCOUNTER — APPOINTMENT (OUTPATIENT)
Dept: OBGYN | Facility: CLINIC | Age: 24
End: 2024-10-22
Payer: COMMERCIAL

## 2024-10-22 DIAGNOSIS — Z34.93 ENCOUNTER FOR SUPERVISION OF NORMAL PREGNANCY, UNSPECIFIED, THIRD TRIMESTER: ICD-10-CM

## 2024-10-22 PROCEDURE — 0502F SUBSEQUENT PRENATAL CARE: CPT

## 2024-10-22 PROCEDURE — 36415 COLL VENOUS BLD VENIPUNCTURE: CPT

## 2024-10-23 LAB
BASOPHILS # BLD AUTO: 0.03 K/UL
BASOPHILS NFR BLD AUTO: 0.2 %
EOSINOPHIL # BLD AUTO: 0.11 K/UL
EOSINOPHIL NFR BLD AUTO: 0.8 %
HCT VFR BLD CALC: 33.4 %
HGB BLD-MCNC: 11.2 G/DL
IMM GRANULOCYTES NFR BLD AUTO: 0.8 %
LYMPHOCYTES # BLD AUTO: 2.24 K/UL
LYMPHOCYTES NFR BLD AUTO: 17 %
MAN DIFF?: NORMAL
MCHC RBC-ENTMCNC: 31.2 PG
MCHC RBC-ENTMCNC: 33.5 GM/DL
MCV RBC AUTO: 93 FL
MONOCYTES # BLD AUTO: 0.7 K/UL
MONOCYTES NFR BLD AUTO: 5.3 %
NEUTROPHILS # BLD AUTO: 10.02 K/UL
NEUTROPHILS NFR BLD AUTO: 75.9 %
PLATELET # BLD AUTO: 197 K/UL
RBC # BLD: 3.59 M/UL
RBC # FLD: 13.2 %
WBC # FLD AUTO: 13.2 K/UL

## 2024-10-25 ENCOUNTER — NON-APPOINTMENT (OUTPATIENT)
Age: 24
End: 2024-10-25

## 2024-10-28 ENCOUNTER — NON-APPOINTMENT (OUTPATIENT)
Age: 24
End: 2024-10-28

## 2024-10-28 ENCOUNTER — APPOINTMENT (OUTPATIENT)
Dept: OBGYN | Facility: CLINIC | Age: 24
End: 2024-10-28
Payer: COMMERCIAL

## 2024-10-28 ENCOUNTER — ASOB RESULT (OUTPATIENT)
Age: 24
End: 2024-10-28

## 2024-10-28 DIAGNOSIS — Z34.83 ENCOUNTER FOR SUPERVISION OF OTHER NORMAL PREGNANCY, THIRD TRIMESTER: ICD-10-CM

## 2024-10-28 PROCEDURE — 0502F SUBSEQUENT PRENATAL CARE: CPT

## 2024-10-28 PROCEDURE — 76816 OB US FOLLOW-UP PER FETUS: CPT

## 2024-10-28 PROCEDURE — 76819 FETAL BIOPHYS PROFIL W/O NST: CPT | Mod: 59

## 2024-10-29 ENCOUNTER — NON-APPOINTMENT (OUTPATIENT)
Age: 24
End: 2024-10-29

## 2024-10-29 ENCOUNTER — OUTPATIENT (OUTPATIENT)
Dept: OUTPATIENT SERVICES | Facility: HOSPITAL | Age: 24
LOS: 1 days | End: 2024-10-29
Payer: COMMERCIAL

## 2024-10-29 ENCOUNTER — TRANSCRIPTION ENCOUNTER (OUTPATIENT)
Age: 24
End: 2024-10-29

## 2024-10-29 VITALS
SYSTOLIC BLOOD PRESSURE: 109 MMHG | RESPIRATION RATE: 16 BRPM | DIASTOLIC BLOOD PRESSURE: 59 MMHG | TEMPERATURE: 99 F | HEART RATE: 109 BPM | OXYGEN SATURATION: 99 %

## 2024-10-29 VITALS — TEMPERATURE: 98 F | RESPIRATION RATE: 16 BRPM

## 2024-10-29 VITALS — HEART RATE: 95 BPM | OXYGEN SATURATION: 98 %

## 2024-10-29 DIAGNOSIS — O26.899 OTHER SPECIFIED PREGNANCY RELATED CONDITIONS, UNSPECIFIED TRIMESTER: ICD-10-CM

## 2024-10-29 PROCEDURE — G0463: CPT

## 2024-10-29 NOTE — OB PROVIDER TRIAGE NOTE - NSHPPHYSICALEXAM_GEN_ALL_CORE
Objective  – VS  T(C): 37 (10-29-24 @ 12:39)  HR: 99 (10-29-24 @ 12:45)  BP: 109/59 (10-29-24 @ 12:39)  RR: 15 (10-29-24 @ 12:39)  SpO2: 98% (10-29-24 @ 12:45)    Physical Exam  CV: RRR  Pulm: breathing comfortably on RA  Abd: gravid, nontender  Extr: moving all extremities with ease  – VE: 1.5/50/-3  – FHT: baseline 145, mod variability, +accels, -decels  – [place]: rare  – EFW: 7#13 yesterday per pt  – Sono: vertex Objective  – VS  T(C): 37 (10-29-24 @ 12:39)  HR: 99 (10-29-24 @ 12:45)  BP: 109/59 (10-29-24 @ 12:39)  RR: 15 (10-29-24 @ 12:39)  SpO2: 98% (10-29-24 @ 12:45)    Physical Exam  CV: RRR  Pulm: breathing comfortably on RA  Abd: gravid, nontender  Extr: moving all extremities with ease  – VE: 1.5/50/-3  – FHT: baseline 145, mod variability, +accels, -decels  – [place]: rare  – EFW: 7#13 yesterday per pt  – Sono: vertex, GERALD 9, gross movement appreciated, breathing visualized

## 2024-10-29 NOTE — OB PROVIDER TRIAGE NOTE - HISTORY OF PRESENT ILLNESS
R1 Triage H&P    Subjective  HPI: 24y  at 39w0d presents for possible labor. +FM. -LOF. -VB. Pt denies any other concerns. Patient states contractions began at around 9pm last night and have been about 3 minutes apart. She is able to talk through her contractions, but finds it hard to take a deep breath. Since arriving in triage, she has not felt any contractions.     – PNC: Denies prenatal issues. GBS neg.  EFW 7#13 yesterday per pt  – OBHx: SAB w D&C x1   – GynHx: denies fibroids, cysts, endometriosis, abnormal pap smears, STIs  – PMH: denies  – PSH: breast biopsy (benign)  – Psych: denies   – Social: denies   – Meds: PNV, Fe, Vit C, Vit D  – Allergies: NKDA  – Will accept blood transfusions? Yes

## 2024-10-29 NOTE — OB PROVIDER TRIAGE NOTE - NSOBPROVIDERNOTE_OBGYN_ALL_OB_FT
Assessment  24y  at 39w0d presents for rule out labor. Patient states contractions have spaced since arriving in triage. Patient comfortable. BPP .    Plan  - d/c with labor precautions  - Pt counseled on return to triage if experiencing decreased fetal movement, regular contractions every 3-4 minutes, leakage of fluid, vaginal bleeding    Plan per attending physician, Dr. Vernon Collazo PGY1

## 2024-11-01 ENCOUNTER — NON-APPOINTMENT (OUTPATIENT)
Age: 24
End: 2024-11-01

## 2024-11-01 ENCOUNTER — OUTPATIENT (OUTPATIENT)
Dept: OUTPATIENT SERVICES | Facility: HOSPITAL | Age: 24
LOS: 1 days | End: 2024-11-01
Payer: COMMERCIAL

## 2024-11-01 ENCOUNTER — INPATIENT (INPATIENT)
Facility: HOSPITAL | Age: 24
LOS: 1 days | Discharge: HOME | End: 2024-11-03
Attending: OBSTETRICS & GYNECOLOGY | Admitting: OBSTETRICS & GYNECOLOGY
Payer: COMMERCIAL

## 2024-11-01 VITALS — SYSTOLIC BLOOD PRESSURE: 132 MMHG | DIASTOLIC BLOOD PRESSURE: 77 MMHG | HEART RATE: 98 BPM | OXYGEN SATURATION: 96 %

## 2024-11-01 VITALS — SYSTOLIC BLOOD PRESSURE: 123 MMHG | HEART RATE: 82 BPM | DIASTOLIC BLOOD PRESSURE: 79 MMHG

## 2024-11-01 DIAGNOSIS — Z34.80 ENCOUNTER FOR SUPERVISION OF OTHER NORMAL PREGNANCY, UNSPECIFIED TRIMESTER: ICD-10-CM

## 2024-11-01 DIAGNOSIS — O26.899 OTHER SPECIFIED PREGNANCY RELATED CONDITIONS, UNSPECIFIED TRIMESTER: ICD-10-CM

## 2024-11-01 LAB
BASOPHILS # BLD AUTO: 0.03 K/UL — SIGNIFICANT CHANGE UP (ref 0–0.2)
BASOPHILS NFR BLD AUTO: 0.2 % — SIGNIFICANT CHANGE UP (ref 0–2)
BLD GP AB SCN SERPL QL: NEGATIVE — SIGNIFICANT CHANGE UP
EOSINOPHIL # BLD AUTO: 0.03 K/UL — SIGNIFICANT CHANGE UP (ref 0–0.5)
EOSINOPHIL NFR BLD AUTO: 0.2 % — SIGNIFICANT CHANGE UP (ref 0–6)
HCT VFR BLD CALC: 34.3 % — LOW (ref 34.5–45)
HGB BLD-MCNC: 12.1 G/DL — SIGNIFICANT CHANGE UP (ref 11.5–15.5)
IMM GRANULOCYTES NFR BLD AUTO: 0.8 % — SIGNIFICANT CHANGE UP (ref 0–0.9)
LYMPHOCYTES # BLD AUTO: 1.91 K/UL — SIGNIFICANT CHANGE UP (ref 1–3.3)
LYMPHOCYTES # BLD AUTO: 14.3 % — SIGNIFICANT CHANGE UP (ref 13–44)
MCHC RBC-ENTMCNC: 32.2 PG — SIGNIFICANT CHANGE UP (ref 27–34)
MCHC RBC-ENTMCNC: 35.3 G/DL — SIGNIFICANT CHANGE UP (ref 32–36)
MCV RBC AUTO: 91.2 FL — SIGNIFICANT CHANGE UP (ref 80–100)
MONOCYTES # BLD AUTO: 0.62 K/UL — SIGNIFICANT CHANGE UP (ref 0–0.9)
MONOCYTES NFR BLD AUTO: 4.6 % — SIGNIFICANT CHANGE UP (ref 2–14)
NEUTROPHILS # BLD AUTO: 10.64 K/UL — HIGH (ref 1.8–7.4)
NEUTROPHILS NFR BLD AUTO: 79.9 % — HIGH (ref 43–77)
NRBC # BLD: 0 /100 WBCS — SIGNIFICANT CHANGE UP (ref 0–0)
PLATELET # BLD AUTO: 191 K/UL — SIGNIFICANT CHANGE UP (ref 150–400)
RBC # BLD: 3.76 M/UL — LOW (ref 3.8–5.2)
RBC # FLD: 12.5 % — SIGNIFICANT CHANGE UP (ref 10.3–14.5)
RH IG SCN BLD-IMP: POSITIVE — SIGNIFICANT CHANGE UP
WBC # BLD: 13.34 K/UL — HIGH (ref 3.8–10.5)
WBC # FLD AUTO: 13.34 K/UL — HIGH (ref 3.8–10.5)

## 2024-11-01 PROCEDURE — G0463: CPT

## 2024-11-01 PROCEDURE — 99212 OFFICE O/P EST SF 10 MIN: CPT

## 2024-11-01 RX ORDER — OXYTOCIN IN D5W-0.2% SODIUM CL 15/250 ML
167 PLASTIC BAG, INJECTION (ML) INTRAVENOUS
Qty: 30 | Refills: 0 | Status: DISCONTINUED | OUTPATIENT
Start: 2024-11-01 | End: 2024-11-03

## 2024-11-01 RX ORDER — INFLUENZ VIR VAC TV P-SURF2003 15MCG/.5ML
0.5 SYRINGE (ML) INTRAMUSCULAR ONCE
Refills: 0 | Status: DISCONTINUED | OUTPATIENT
Start: 2024-11-01 | End: 2024-11-03

## 2024-11-01 RX ORDER — CHLORHEXIDINE GLUCONATE 40 MG/ML
1 SOLUTION TOPICAL DAILY
Refills: 0 | Status: DISCONTINUED | OUTPATIENT
Start: 2024-11-01 | End: 2024-11-02

## 2024-11-01 RX ORDER — CITRIC ACID/SODIUM CITRATE 334-500MG
15 SOLUTION, ORAL ORAL EVERY 6 HOURS
Refills: 0 | Status: DISCONTINUED | OUTPATIENT
Start: 2024-11-01 | End: 2024-11-02

## 2024-11-01 NOTE — OB PROVIDER TRIAGE NOTE - NSOBPROVIDERNOTE_OBGYN_ALL_OB_FT
24 year old  at 39.4 weeks in early labor, reassuring BPP, now feeling fetal movement;  awaiting continuous reactive NST    -Discharge home with term labor precautions  -Return to L and D if regular painful contractions, leaking of clear fluid, vaginal bleeding or decreased fetal movement  -Follow up in OB office Tuesday if undelivered    d/w MD Ousmane Esteves [facility]

## 2024-11-01 NOTE — OB PROVIDER H&P - ASSESSMENT
A/P: 24y  at 39.4 weeks (ALISSA:24) admitted in labor.   Plan:  - Admit to L&D  - Routine labs, IVF, NPO  - EFM: 130hr, moderate variability, +accelerations, -decelerations   - Will continue to monitor EFM/ TOCO  - Resuscitative measures PRN   - GBS: Negative  - EFW: 3300g  - Anesthesia called for epidural placement  - Expectant management  - Anticipate   - Plan per Dr. Romeo

## 2024-11-01 NOTE — OB PROVIDER TRIAGE NOTE - HISTORY OF PRESENT ILLNESS
Pt received in bed alert and oriented and resting in bed. Pt admitted and initially pt's BP was 161/112 discussed with Dr. Lockwood and received stat orders to treat HTN. Repeat BP done prior to medication administration and it was noted that pt's /93. Findings discussed again with Dr. Lockwood. Meds held and pt will be transported to Unit via stretcher. 24y  at 39.4 weeks presents with irregular contractions since 1pm and bloody mucous. -LOF, decreased fetal movement. Uncomplicated pregnancy. -GBS.    OBHx: SAB w D&C x1   MedHx: denies  SurgHx: breast biopsy (benign)  GynHx: denies fibroids, cysts, endometriosis, abnormal pap smears, STIs  PsychHx: denies depression, anxiety  SocialHx: denies ETOH, tobacco, drug use  Meds: PNV, Fe, Vit C, Vit D  All: NKDA    Vital Signs Last 24 Hrs  T(C): 36.8 (2024 16:53), Max: 36.8 (2024 16:52)  T(F): 98.2 (2024 16:53), Max: 98.24 (2024 16:52)  HR: 78 (2024 17:53) (74 - 98)  BP: 129/69 (2024 16:53) (129/69 - 129/69)  BP(mean): --  RR: 17 (2024 16:53) (17 - 17)  SpO2: 99% (2024 17:53) (98% - 99%)    PE: NAD, AOx3, abdomen soft gravid  VE: 3.5/70/-3  EFM: 130, moderate variability, +accels, -decels  [place]: q4 min  EFW: 3300 grams  Sono: BPP 8/8, GERALD 9.1, cephalic

## 2024-11-01 NOTE — OB PROVIDER TRIAGE NOTE - LABOR: CERVICAL CONSISTENCY
Patient presents with grandmother after twisting her right knee/leg while going down the slide about 30 minutes PTA. Patient is now unable to stand and keeps crying. ABCDs intact, alert and acting age appropriate.    soft

## 2024-11-01 NOTE — OB PROVIDER H&P - NSLOWPPHRISK_OBGYN_A_OB
No previous uterine incision/La Pregnancy/Less than or equal to 4 previous vaginal births/No known bleeding disorder

## 2024-11-01 NOTE — OB PROVIDER H&P - HISTORY OF PRESENT ILLNESS
24y  at 39.4 weeks presents with painful contractions q2-3 minutes. Patient noted to make cervical change from 3.5/70/-3 to 4.5/80/-2. Pt. admits to good fetal movement, and denies vaginal bleeding or leakage of fluids at this time.     GBS: Negative   OBHx: SAB with D&C x1,   MedHx: Denies   SurgHx: Breast biopsy (benign)  GynHx: Denies fibroids, cysts, endometriosis, abnormal pap smears, STIs  PsychHx: Denies depression, anxiety  SocialHx: Denies ETOH, tobacco, drug use  Meds: PNV, Fe, Vit C, Vitamin D  All: NKDA

## 2024-11-01 NOTE — OB RN PATIENT PROFILE - PRO MENTAL HEALTH SX RECENT
Yodit Kessler is calling to request a refill on the following medication(s):    Medication Request:  Requested Prescriptions     Pending Prescriptions Disp Refills    ibuprofen (ADVIL;MOTRIN) 400 MG tablet 120 tablet 3     Sig: Take 1 tablet by mouth every 6 hours as needed for Pain       Last Visit Date (If Applicable):  9/3/2024    Next Visit Date:    1/14/2025                 none

## 2024-11-01 NOTE — OB PROVIDER H&P - NSHPPHYSICALEXAM_GEN_ALL_CORE
CV: S1,S2  Pulmonary: Clear to auscultation bilaterally    Abdomen: Gravid abdomen  Extremities: Nontender to palpation bilaterally     EFM: 130hr, moderate variability, +accelerations, -decelerations   TOCO: Contractions q2-3 minutes  SVE: 4.5/ 80 / -2  BSUS: Vertex CV: S1,S2  Pulmonary: Clear to auscultation bilaterally    Abdomen: Gravid abdomen  Extremities: Nontender to palpation bilaterally       EFM: 130hr, moderate variability, +accelerations, -decelerations   TOCO: Contractions q2-3 minutes  SVE: 4.5/ 80 / -2  BSUS: Vertex

## 2024-11-02 ENCOUNTER — NON-APPOINTMENT (OUTPATIENT)
Age: 24
End: 2024-11-02

## 2024-11-02 LAB — T PALLIDUM AB TITR SER: NEGATIVE — SIGNIFICANT CHANGE UP

## 2024-11-02 PROCEDURE — 59400 OBSTETRICAL CARE: CPT

## 2024-11-02 RX ORDER — SIMETHICONE 80 MG/1
80 TABLET, CHEWABLE ORAL EVERY 4 HOURS
Refills: 0 | Status: DISCONTINUED | OUTPATIENT
Start: 2024-11-02 | End: 2024-11-03

## 2024-11-02 RX ORDER — MAGNESIUM HYDROXIDE 1200 MG/15ML
30 SUSPENSION ORAL
Refills: 0 | Status: DISCONTINUED | OUTPATIENT
Start: 2024-11-02 | End: 2024-11-03

## 2024-11-02 RX ORDER — SODIUM CHLORIDE 9 MG/ML
3 INJECTION, SOLUTION INTRAMUSCULAR; INTRAVENOUS; SUBCUTANEOUS EVERY 8 HOURS
Refills: 0 | Status: DISCONTINUED | OUTPATIENT
Start: 2024-11-02 | End: 2024-11-03

## 2024-11-02 RX ORDER — PRENATAL VIT/IRON FUM/FOLIC AC 60 MG-1 MG
1 TABLET ORAL DAILY
Refills: 0 | Status: DISCONTINUED | OUTPATIENT
Start: 2024-11-02 | End: 2024-11-03

## 2024-11-02 RX ORDER — MODIFIED LANOLIN
1 OINTMENT (GRAM) TOPICAL EVERY 6 HOURS
Refills: 0 | Status: DISCONTINUED | OUTPATIENT
Start: 2024-11-02 | End: 2024-11-03

## 2024-11-02 RX ORDER — DIPHENHYDRAMINE HCL 12.5MG/5ML
25 ELIXIR ORAL EVERY 6 HOURS
Refills: 0 | Status: DISCONTINUED | OUTPATIENT
Start: 2024-11-02 | End: 2024-11-03

## 2024-11-02 RX ORDER — BENZOCAINE 200 MG/G
1 GEL ORAL EVERY 6 HOURS
Refills: 0 | Status: DISCONTINUED | OUTPATIENT
Start: 2024-11-02 | End: 2024-11-03

## 2024-11-02 RX ORDER — DIBUCAINE 1 %
1 OINTMENT (GRAM) TOPICAL EVERY 6 HOURS
Refills: 0 | Status: DISCONTINUED | OUTPATIENT
Start: 2024-11-02 | End: 2024-11-03

## 2024-11-02 RX ORDER — HYDROCORTISONE 1 %
1 OINTMENT (GRAM) TOPICAL EVERY 6 HOURS
Refills: 0 | Status: DISCONTINUED | OUTPATIENT
Start: 2024-11-02 | End: 2024-11-03

## 2024-11-02 RX ORDER — KETOROLAC TROMETHAMINE 30 MG/ML
30 INJECTION INTRAMUSCULAR; INTRAVENOUS ONCE
Refills: 0 | Status: DISCONTINUED | OUTPATIENT
Start: 2024-11-02 | End: 2024-11-02

## 2024-11-02 RX ORDER — IBUPROFEN 200 MG
600 TABLET ORAL EVERY 6 HOURS
Refills: 0 | Status: DISCONTINUED | OUTPATIENT
Start: 2024-11-02 | End: 2024-11-03

## 2024-11-02 RX ORDER — OXYTOCIN IN D5W-0.2% SODIUM CL 15/250 ML
2 PLASTIC BAG, INJECTION (ML) INTRAVENOUS
Qty: 30 | Refills: 0 | Status: DISCONTINUED | OUTPATIENT
Start: 2024-11-02 | End: 2024-11-03

## 2024-11-02 RX ORDER — PRAMOXINE HCL 1 %
1 CREAM (GRAM) RECTAL EVERY 4 HOURS
Refills: 0 | Status: DISCONTINUED | OUTPATIENT
Start: 2024-11-02 | End: 2024-11-03

## 2024-11-02 RX ORDER — OXYTOCIN IN D5W-0.2% SODIUM CL 15/250 ML
167 PLASTIC BAG, INJECTION (ML) INTRAVENOUS
Qty: 30 | Refills: 0 | Status: DISCONTINUED | OUTPATIENT
Start: 2024-11-02 | End: 2024-11-03

## 2024-11-02 RX ORDER — ONDANSETRON HYDROCHLORIDE 2 MG/ML
4 INJECTION, SOLUTION INTRAMUSCULAR; INTRAVENOUS ONCE
Refills: 0 | Status: COMPLETED | OUTPATIENT
Start: 2024-11-02 | End: 2024-11-02

## 2024-11-02 RX ORDER — CLOSTRIDIUM TETANI TOXOID ANTIGEN (FORMALDEHYDE INACTIVATED), CORYNEBACTERIUM DIPHTHERIAE TOXOID ANTIGEN (FORMALDEHYDE INACTIVATED), BORDETELLA PERTUSSIS TOXOID ANTIGEN (GLUTARALDEHYDE INACTIVATED), BORDETELLA PERTUSSIS FILAMENTOUS HEMAGGLUTININ ANTIGEN (FORMALDEHYDE INACTIVATED), BORDETELLA PERTUSSIS PERTACTIN ANTIGEN, AND BORDETELLA PERTUSSIS FIMBRIAE 2/3 ANTIGEN 5; 2; 2.5; 5; 3; 5 [LF]/.5ML; [LF]/.5ML; UG/.5ML; UG/.5ML; UG/.5ML; UG/.5ML
0.5 INJECTION, SUSPENSION INTRAMUSCULAR ONCE
Refills: 0 | Status: DISCONTINUED | OUTPATIENT
Start: 2024-11-02 | End: 2024-11-03

## 2024-11-02 RX ORDER — ACETAMINOPHEN 500 MG
975 TABLET ORAL
Refills: 0 | Status: DISCONTINUED | OUTPATIENT
Start: 2024-11-02 | End: 2024-11-03

## 2024-11-02 RX ORDER — IBUPROFEN 200 MG
600 TABLET ORAL EVERY 6 HOURS
Refills: 0 | Status: COMPLETED | OUTPATIENT
Start: 2024-11-02 | End: 2025-10-01

## 2024-11-02 RX ADMIN — Medication 600 MILLIGRAM(S): at 18:04

## 2024-11-02 RX ADMIN — Medication 975 MILLIGRAM(S): at 16:10

## 2024-11-02 RX ADMIN — Medication 600 MILLIGRAM(S): at 18:45

## 2024-11-02 RX ADMIN — KETOROLAC TROMETHAMINE 30 MILLIGRAM(S): 30 INJECTION INTRAMUSCULAR; INTRAVENOUS at 08:47

## 2024-11-02 RX ADMIN — Medication 1000 MILLILITER(S): at 08:42

## 2024-11-02 RX ADMIN — Medication 975 MILLIGRAM(S): at 22:00

## 2024-11-02 RX ADMIN — Medication 2 MILLIUNIT(S)/MIN: at 06:01

## 2024-11-02 RX ADMIN — Medication 975 MILLIGRAM(S): at 21:21

## 2024-11-02 RX ADMIN — ONDANSETRON HYDROCHLORIDE 4 MILLIGRAM(S): 2 INJECTION, SOLUTION INTRAMUSCULAR; INTRAVENOUS at 05:05

## 2024-11-02 RX ADMIN — Medication 167 MILLIUNIT(S)/MIN: at 07:42

## 2024-11-02 NOTE — OB PROVIDER DELIVERY SUMMARY - NSPROVIDERDELIVERYNOTE_OBGYN_ALL_OB_FT
Patient was found to be fully dilated and directed to push with contractions.  Spontaneous vaginal delivery of liveborn female from PERLA position.  Head, shoulders and body delivered easily.   Cord was delayed 1 minute. Cord was cut.  Infant was passed to mother.  Placenta delivered spontaneously intact. Uterine massage was performed and pitocin was given.  Vaginal walls, sulci, and cervix examined and noted to be intact.  Fundus was firm. Perineum intact, labial abrasion reapproximated with 3-0 Chromic.  Good hemostasis was noted.  Count correct x2.     Delivered with Attending Dr. Ousmane Collazo, PGY1

## 2024-11-02 NOTE — OB RN DELIVERY SUMMARY - NSSELHIDDEN_OBGYN_ALL_OB_FT
[NS_DeliveryAttending1_OBGYN_ALL_OB_FT:[email or web address]],[NS_DeliveryAssist1_OBGYN_ALL_OB_FT:[email or web address]=],[NS_DeliveryRN_OBGYN_ALL_OB_FT:[id number]=]

## 2024-11-02 NOTE — OB RN DELIVERY SUMMARY - NS_SEPSISRSKCALC_OBGYN_ALL_OB_FT
EOS calculated successfully. EOS Risk Factor: 0.5/1000 live births ([facility] national incidence); GA=39w5d; Temp=100.4; ROM=7.367; GBS='Negative'; Antibiotics='No antibiotics or any antibiotics < 2 hrs prior to birth'

## 2024-11-02 NOTE — OB PROVIDER LABOR PROGRESS NOTE - NS_SUBJECTIVE/OBJECTIVE_OBGYN_ALL_OB_FT
Patient seen and examined at bedside, c/o increased pressure. Patient has an epidural in place.
pt c/o rectal pressure
pt c/o vaginal pressure

## 2024-11-02 NOTE — OB PROVIDER DELIVERY SUMMARY - NSSELHIDDEN_OBGYN_ALL_OB_FT
[NS_DeliveryAttending1_OBGYN_ALL_OB_FT:[email or web address]],[NS_DeliveryAssist1_OBGYN_ALL_OB_FT:[email or web address]=]

## 2024-11-03 VITALS
RESPIRATION RATE: 18 BRPM | HEART RATE: 88 BPM | SYSTOLIC BLOOD PRESSURE: 99 MMHG | TEMPERATURE: 98 F | OXYGEN SATURATION: 99 % | DIASTOLIC BLOOD PRESSURE: 64 MMHG

## 2024-11-03 PROCEDURE — 59050 FETAL MONITOR W/REPORT: CPT

## 2024-11-03 PROCEDURE — 86900 BLOOD TYPING SEROLOGIC ABO: CPT

## 2024-11-03 PROCEDURE — 85025 COMPLETE CBC W/AUTO DIFF WBC: CPT

## 2024-11-03 PROCEDURE — 86850 RBC ANTIBODY SCREEN: CPT

## 2024-11-03 PROCEDURE — 86901 BLOOD TYPING SEROLOGIC RH(D): CPT

## 2024-11-03 PROCEDURE — 86780 TREPONEMA PALLIDUM: CPT

## 2024-11-03 PROCEDURE — 59025 FETAL NON-STRESS TEST: CPT

## 2024-11-03 RX ORDER — IBUPROFEN 200 MG
1 TABLET ORAL
Qty: 0 | Refills: 0 | DISCHARGE
Start: 2024-11-03

## 2024-11-03 RX ORDER — PRENATAL VIT/IRON FUM/FOLIC AC 60 MG-1 MG
1 TABLET ORAL
Qty: 0 | Refills: 0 | DISCHARGE
Start: 2024-11-03

## 2024-11-03 RX ORDER — ACETAMINOPHEN 500 MG
3 TABLET ORAL
Qty: 0 | Refills: 0 | DISCHARGE
Start: 2024-11-03

## 2024-11-03 RX ADMIN — Medication 975 MILLIGRAM(S): at 03:00

## 2024-11-03 RX ADMIN — Medication 600 MILLIGRAM(S): at 00:45

## 2024-11-03 RX ADMIN — Medication 975 MILLIGRAM(S): at 02:21

## 2024-11-03 RX ADMIN — Medication 975 MILLIGRAM(S): at 15:39

## 2024-11-03 RX ADMIN — Medication 600 MILLIGRAM(S): at 06:13

## 2024-11-03 RX ADMIN — Medication 600 MILLIGRAM(S): at 12:20

## 2024-11-03 RX ADMIN — Medication 975 MILLIGRAM(S): at 14:39

## 2024-11-03 RX ADMIN — Medication 600 MILLIGRAM(S): at 00:07

## 2024-11-03 RX ADMIN — Medication 600 MILLIGRAM(S): at 05:33

## 2024-11-03 RX ADMIN — Medication 975 MILLIGRAM(S): at 09:30

## 2024-11-03 RX ADMIN — Medication 975 MILLIGRAM(S): at 08:30

## 2024-11-03 RX ADMIN — Medication 1 TABLET(S): at 11:20

## 2024-11-03 RX ADMIN — Medication 600 MILLIGRAM(S): at 11:20

## 2024-11-03 NOTE — DISCHARGE NOTE OB - CARE PROVIDERS DIRECT ADDRESSES
[email or web address]

## 2024-11-03 NOTE — PROGRESS NOTE ADULT - ATTENDING COMMENTS
pt seen and examined. agree with above.  PPD #1, s/p , doing well  VSS  FF and below umb  pain controlled with po pain meds prn.  ambulation encouraged.  baby well, breast/bottle feeding  likely dc home today

## 2024-11-03 NOTE — DISCHARGE NOTE OB - CARE PROVIDER_API CALL
Jonna Romeo  Obstetrics and Gynecology  [address] [place] [address], [facility] [address]  [place] [address]  Phone: [phone]  Fax: [phone]  Follow Up Time:

## 2024-11-03 NOTE — DISCHARGE NOTE OB - FINANCIAL ASSISTANCE
[facility] provides services at a reduced cost to those who are determined to be eligible through [facility]’s financial assistance program. Information regarding [facility]’s financial assistance program can be found by going to [email or web address] or by calling [phone]. 01-Dec-2018

## 2024-11-03 NOTE — DISCHARGE NOTE OB - CARE PLAN
1 Principal Discharge DX:	 (normal spontaneous vaginal delivery)  Assessment and plan of treatment:	follow up in 6 weeks for PPV. pelvic rest. activity as tolerated. regular diet.

## 2024-11-03 NOTE — DISCHARGE NOTE OB - MEDICATION SUMMARY - MEDICATIONS TO STOP TAKING
I will STOP taking the medications listed below when I get home from the hospital:    amoxicillin-clavulanate 875 mg-125 mg oral tablet  -- 1 tab(s) by mouth 2 times a day

## 2024-11-03 NOTE — DISCHARGE NOTE OB - MATERIALS PROVIDED
Vaccinations/[facility]  Screening Program/  Immunization Record/Breastfeeding Log/Bottle Feeding Log/Breastfeeding Mother’s Support Group Information/Guide to Postpartum Care/[facility] Hearing Screen Program/Back To Sleep Handout/Shaken Baby Prevention Handout/Breastfeeding Guide and Packet/Birth Certificate Instructions

## 2024-11-03 NOTE — PROGRESS NOTE ADULT - SUBJECTIVE AND OBJECTIVE BOX
Postpartum Note- PPD#1    Prenatal Labs  Blood type: O Positive  Rubella IgG: immune  RPR: Negative      S: Patient w/o complaints, pain is controlled. Pt is OOB, tolerating PO, voiding. Lochia WNL.     O:  Vital Signs Last 24 Hrs  T(C): 36.4 (03 Nov 2024 06:19), Max: 38 (02 Nov 2024 08:11)  T(F): 97.6 (03 Nov 2024 06:19), Max: 100.4 (02 Nov 2024 08:11)  HR: 88 (03 Nov 2024 06:19) (63 - 110)  BP: 99/64 (03 Nov 2024 06:19) (99/64 - 131/59)  BP(mean): --  RR: 18 (03 Nov 2024 06:19) (16 - 20)  SpO2: 99% (03 Nov 2024 06:19) (77% - 99%)    Parameters below as of 03 Nov 2024 06:19  Patient On (Oxygen Delivery Method): room air         Gen: NAD  Abdomen: Soft, nontender, non-distended, fundus firm.  Vaginal: Lochia WNL    Ext:  Neg calf tenderness    LABS:    Hemoglobin: 12.1 g/dL (11-01 @ 23:05)      Hematocrit: 34.3 % (11-01 @ 23:05)

## 2024-11-03 NOTE — DISCHARGE NOTE OB - NS MD DC FALL RISK RISK
For information on Fall & Injury Prevention, visit: [email or web address] OR  [email or web address] OR  https://www.cdc.gov/steadi/patient.html

## 2024-11-04 DIAGNOSIS — O47.1 FALSE LABOR AT OR AFTER 37 COMPLETED WEEKS OF GESTATION: ICD-10-CM

## 2024-11-04 DIAGNOSIS — Z3A.39 39 WEEKS GESTATION OF PREGNANCY: ICD-10-CM

## 2024-11-04 DIAGNOSIS — O09.293 SUPERVISION OF PREGNANCY WITH OTHER POOR REPRODUCTIVE OR OBSTETRIC HISTORY, THIRD TRIMESTER: ICD-10-CM

## 2024-11-04 DIAGNOSIS — Z87.59 PERSONAL HISTORY OF OTHER COMPLICATIONS OF PREGNANCY, CHILDBIRTH AND THE PUERPERIUM: ICD-10-CM

## 2024-11-05 ENCOUNTER — APPOINTMENT (OUTPATIENT)
Dept: OBGYN | Facility: CLINIC | Age: 24
End: 2024-11-05

## 2024-11-07 DIAGNOSIS — Z3A.39 39 WEEKS GESTATION OF PREGNANCY: ICD-10-CM

## 2024-11-07 DIAGNOSIS — O09.293 SUPERVISION OF PREGNANCY WITH OTHER POOR REPRODUCTIVE OR OBSTETRIC HISTORY, THIRD TRIMESTER: ICD-10-CM

## 2024-11-07 DIAGNOSIS — O47.1 FALSE LABOR AT OR AFTER 37 COMPLETED WEEKS OF GESTATION: ICD-10-CM

## 2024-11-07 DIAGNOSIS — O36.8130 DECREASED FETAL MOVEMENTS, THIRD TRIMESTER, NOT APPLICABLE OR UNSPECIFIED: ICD-10-CM

## 2024-11-11 ENCOUNTER — TRANSCRIPTION ENCOUNTER (OUTPATIENT)
Age: 24
End: 2024-11-11

## 2024-11-11 ENCOUNTER — NON-APPOINTMENT (OUTPATIENT)
Age: 24
End: 2024-11-11

## 2024-11-11 DIAGNOSIS — N61.0 MASTITIS WITHOUT ABSCESS: ICD-10-CM

## 2024-11-11 RX ORDER — DICLOXACILLIN SODIUM 500 MG/1
500 CAPSULE ORAL 4 TIMES DAILY
Qty: 28 | Refills: 0 | Status: ACTIVE | COMMUNITY
Start: 2024-11-11 | End: 1900-01-01

## 2024-12-13 NOTE — H&P PST ADULT - OPHTHALMOLOGIC
1st letter created, please print letter from today 11/14/24 and mail to patient's parent. Thanks!  
2nd letter sent   
In an effort to ensure that our patients LiveWell, a Team Member has reviewed your chart and identified an opportunity to provide the best care possible. An attempt was made to discuss or schedule due or overdue Preventive or Chronic Condition care.Care Gaps identified: Wellness Visits.    The Outcome was Contact was not made, left message. We are attempting to schedule a yearly wellness visit. If you have any questions or need help with scheduling, contact your primary care provider..   Type of Appointment needed: Comprehensive Annual Visit  
In an effort to ensure that our patients [employer]Well, a Team Member has reviewed your chart and identified an opportunity to provide the best care possible. An attempt was made to discuss or schedule due or overdue Preventive or Chronic Condition care.Care Gaps identified: Wellness Visits.    The Outcome was Contact was not made, left message. We are attempting to schedule a yearly wellness visit. If you have any questions or need help with scheduling, contact your primary care provider..   Type of Appointment needed: Comprehensive Annual Visit  
In an effort to ensure that our patients [employer]Well, a Team Member has reviewed your chart and identified an opportunity to provide the best care possible. An attempt was made to discuss or schedule due or overdue Preventive or Chronic Condition care.Care Gaps identified: Wellness Visits.    The Outcome was Contact was not made, left message. We are attempting to schedule a yearly wellness visit. If you have any questions or need help with scheduling, contact your primary care provider..   Type of Appointment needed: Primary Care Visit   
Letter sent   
Patient due for an annual physical; please call to schedule an appointment.     If patient is no longer being seen at our practice, please obtain the new PCP's name if possible and update PCP in StoryBoard. If name is not provided, but patient confirms they will no longer be seen here, change the PCP to \"Outside [facility], PCP Unknown\" and add note in the comment section.    Thank you.   
Updated storyboard.  
negative

## 2024-12-16 ENCOUNTER — APPOINTMENT (OUTPATIENT)
Dept: OBGYN | Facility: CLINIC | Age: 24
End: 2024-12-16
Payer: COMMERCIAL

## 2024-12-16 VITALS
SYSTOLIC BLOOD PRESSURE: 94 MMHG | BODY MASS INDEX: 27.64 KG/M2 | HEIGHT: 63 IN | WEIGHT: 156 LBS | DIASTOLIC BLOOD PRESSURE: 63 MMHG | HEART RATE: 83 BPM

## 2024-12-16 PROCEDURE — 0503F POSTPARTUM CARE VISIT: CPT

## 2024-12-16 RX ORDER — NORETHINDRONE 0.35 MG/1
0.35 TABLET ORAL DAILY
Qty: 1 | Refills: 3 | Status: ACTIVE | COMMUNITY
Start: 2024-12-16 | End: 1900-01-01

## 2025-01-06 ENCOUNTER — NON-APPOINTMENT (OUTPATIENT)
Age: 25
End: 2025-01-06

## 2025-01-06 ENCOUNTER — TRANSCRIPTION ENCOUNTER (OUTPATIENT)
Age: 25
End: 2025-01-06

## 2025-01-24 NOTE — PHYSICAL EXAM
show [Appropriately responsive] : appropriately responsive [Alert] : alert [No Acute Distress] : no acute distress [No Lymphadenopathy] : no lymphadenopathy [Soft] : soft [Non-tender] : non-tender [Non-distended] : non-distended [No HSM] : No HSM [No Lesions] : no lesions [No Mass] : no mass [Oriented x3] : oriented x3 [Labia Majora] : normal [Labia Minora] : normal [IUD String] : an IUD string was noted [Normal] : normal [Uterine Adnexae] : normal

## 2025-04-16 NOTE — ED ADULT NURSE NOTE - CHIEF COMPLAINT
The patient is a 24y Female complaining of 
Adult female history liver transplant recently admitted now presents with acute on chronic back pain rating to the abdomen moderately severe worse with movement concerns for renal colic, SBO/postsurgical complications, compression fractures/MSK.  Plan CT abdomen pelvis, recons lumbosacral spine, basic labs IV fluids analgesics and transplant surgery consultation..  Jose Rivera MD, Facep

## (undated) DEVICE — VACUUM CURETTE BUSSE HOSP STRAIGHT 7MM

## (undated) DEVICE — VACUUM CURETTE BERKLEY OLYMPUS CURVED 7MM

## (undated) DEVICE — VACUUM CURETTE BUSSE HOSP CURVED 9MM

## (undated) DEVICE — VACUUM CURETTE BERKLEY OLYMPUS CURVED 11MM

## (undated) DEVICE — PACK LITHOTOMY

## (undated) DEVICE — WARMING BLANKET UPPER ADULT

## (undated) DEVICE — VACUUM CURETTE BUSSE HOSP CURVED 10MM

## (undated) DEVICE — DRAPE LIGHT HANDLE COVER (GREEN)

## (undated) DEVICE — VACUUM CURETTE BERKLEY OLYMPUS CURVED 12MM

## (undated) DEVICE — SOCK SPECIMEN 3/8"-1/2" MALE PORT

## (undated) DEVICE — GLV 7 PROTEXIS (WHITE)

## (undated) DEVICE — TUBING UTERINE ASPIRATION 3/8" X 6FT W/O ADAPTER

## (undated) DEVICE — VACUUM CURETTE BUSSE HOSP CURVED 11MM

## (undated) DEVICE — VACUUM CURETTE BUSSE HOSP CURVED 12MM

## (undated) DEVICE — GLV 7 PROTEXIS (BLUE)

## (undated) DEVICE — DRAPE 1/2 SHEET 40X57"

## (undated) DEVICE — VACUUM CURETTE BERKLEY OLYMPUS CURVED 16MM X 1/2"

## (undated) DEVICE — GOWN TRIMAX LG

## (undated) DEVICE — VACUUM CURETTE BERKLEY OLYMPUS CURVED 9MM

## (undated) DEVICE — SOL IRR POUR NS 0.9% 500ML

## (undated) DEVICE — VACUUM CURETTE MEDGYN CURVED 13MM

## (undated) DEVICE — VACUUM CURETTE BERKLEY OLYMPUS F TIP 6MM

## (undated) DEVICE — VACUUM CURETTE BERKLEY OLYMPUS CURVED 8MM

## (undated) DEVICE — VACUUM CURETTE BUSSE HOSP CURVED 14MM